# Patient Record
Sex: MALE | Race: WHITE | NOT HISPANIC OR LATINO | Employment: FULL TIME | URBAN - NONMETROPOLITAN AREA
[De-identification: names, ages, dates, MRNs, and addresses within clinical notes are randomized per-mention and may not be internally consistent; named-entity substitution may affect disease eponyms.]

---

## 2024-09-17 ENCOUNTER — HOSPITAL ENCOUNTER (EMERGENCY)
Facility: HOSPITAL | Age: 64
Discharge: HOME | End: 2024-09-17
Attending: EMERGENCY MEDICINE
Payer: COMMERCIAL

## 2024-09-17 ENCOUNTER — APPOINTMENT (EMERGENCY)
Dept: RADIOLOGY | Facility: HOSPITAL | Age: 64
End: 2024-09-17
Payer: COMMERCIAL

## 2024-09-17 VITALS
TEMPERATURE: 97.5 F | OXYGEN SATURATION: 99 % | HEART RATE: 69 BPM | WEIGHT: 200.84 LBS | SYSTOLIC BLOOD PRESSURE: 164 MMHG | RESPIRATION RATE: 17 BRPM | DIASTOLIC BLOOD PRESSURE: 84 MMHG

## 2024-09-17 DIAGNOSIS — M54.30 SCIATICA: Primary | ICD-10-CM

## 2024-09-17 PROCEDURE — 72100 X-RAY EXAM L-S SPINE 2/3 VWS: CPT

## 2024-09-17 PROCEDURE — 99283 EMERGENCY DEPT VISIT LOW MDM: CPT

## 2024-09-17 PROCEDURE — 99284 EMERGENCY DEPT VISIT MOD MDM: CPT | Performed by: EMERGENCY MEDICINE

## 2024-09-17 RX ORDER — ACETAMINOPHEN 325 MG/1
975 TABLET ORAL ONCE
Status: COMPLETED | OUTPATIENT
Start: 2024-09-17 | End: 2024-09-17

## 2024-09-17 RX ORDER — DIAZEPAM 5 MG
5-10 TABLET ORAL EVERY 6 HOURS PRN
Qty: 10 TABLET | Refills: 0 | Status: SHIPPED | OUTPATIENT
Start: 2024-09-17 | End: 2024-09-27

## 2024-09-17 RX ORDER — IBUPROFEN 400 MG/1
400 TABLET, FILM COATED ORAL ONCE
Status: COMPLETED | OUTPATIENT
Start: 2024-09-17 | End: 2024-09-17

## 2024-09-17 RX ADMIN — ACETAMINOPHEN 325MG 975 MG: 325 TABLET ORAL at 11:36

## 2024-09-17 RX ADMIN — IBUPROFEN 400 MG: 400 TABLET, FILM COATED ORAL at 11:36

## 2024-09-17 NOTE — ED PROVIDER NOTES
1. Sciatica      ED Disposition       ED Disposition   Discharge    Condition   Stable    Date/Time   Tue Sep 17, 2024 11:56 AM    Comment   Yassine A Stump discharge to home/self care.                   Assessment & Plan       Medical Decision Making  This patient presents with left lower back pain radiating into left knee/thigh.   Diagnostic considerations include AAA, discitis, epidural abscess, sciatica. See ED Course.       Amount and/or Complexity of Data Reviewed  Radiology: ordered and independent interpretation performed. Decision-making details documented in ED Course.  ECG/medicine tests: ordered and independent interpretation performed. Decision-making details documented in ED Course.    Risk  OTC drugs.  Prescription drug management.                  ED Course as of 09/17/24 1209   Tue Sep 17, 2024   1151 XR spine lumbar 2 or 3 views injury  No fracture or malalignment interpreted by me   1209 Remained stable for close outpatient follow-up, discussed x-ray findings and close outpatient follow-up with his clinician spine or pain and very agreeable, will return if worse or additional symptoms       Medications   acetaminophen (TYLENOL) tablet 975 mg (975 mg Oral Given 9/17/24 1136)   ibuprofen (MOTRIN) tablet 400 mg (400 mg Oral Given 9/17/24 1136)       History of Present Illness       63-year-old male describes subacute onset left lower back pain that began 4 days ago with radiation into left thigh, muscular fasciculations.  Worse with movement.  No direct injury.  No bowel or bladder changes.  No fever.  No history of cancer or steroid use.      History provided by:  Patient  Back Pain  Location:  Lumbar spine and sacro-iliac joint  Quality:  Aching  Radiates to:  L thigh  Pain severity:  Severe  Onset quality:  Gradual  Timing:  Constant  Progression:  Unchanged  Chronicity:  New  Context: physical stress    Relieved by:  NSAIDs  Worsened by:  Movement  Ineffective treatments:  NSAIDs and OTC  medications  Associated symptoms: no abdominal pain, no bladder incontinence, no bowel incontinence, no fever, no numbness, no paresthesias, no perianal numbness, no tingling and no weakness    Associated symptoms comment:  Patient states left lower extremity is not weak but when ambulating up steps leads with his right leg  Risk factors: no hx of cancer            Review of Systems   Constitutional:  Negative for fever.   Gastrointestinal:  Negative for abdominal pain and bowel incontinence.   Genitourinary:  Negative for bladder incontinence.   Musculoskeletal:  Positive for back pain.   Neurological:  Negative for tingling, weakness, numbness and paresthesias.   All other systems reviewed and are negative.          Objective     ED Triage Vitals   Temperature Pulse Blood Pressure Respirations SpO2 Patient Position - Orthostatic VS   09/17/24 1108 09/17/24 1108 09/17/24 1112 09/17/24 1108 09/17/24 1108 09/17/24 1108   97.5 °F (36.4 °C) 69 164/84 17 99 % Sitting      Temp Source Heart Rate Source BP Location FiO2 (%) Pain Score    09/17/24 1108 09/17/24 1108 09/17/24 1108 -- 09/17/24 1108    Temporal Monitor Right arm  6        Physical Exam  Vitals and nursing note reviewed.   Constitutional:       General: He is not in acute distress.     Appearance: Normal appearance.      Comments: Alert, comfortable appearing, conversational, articulate   HENT:      Head: Normocephalic and atraumatic.      Right Ear: External ear normal.      Left Ear: External ear normal.      Nose: Nose normal.      Mouth/Throat:      Mouth: Mucous membranes are moist.   Eyes:      Extraocular Movements: Extraocular movements intact.      Pupils: Pupils are equal, round, and reactive to light.   Cardiovascular:      Rate and Rhythm: Normal rate and regular rhythm.      Heart sounds: No murmur heard.  Pulmonary:      Effort: Pulmonary effort is normal. No respiratory distress.      Breath sounds: Normal breath sounds. No wheezing or rales.    Abdominal:      General: Abdomen is flat. Bowel sounds are normal. There is no distension.      Tenderness: There is no abdominal tenderness. There is no guarding or rebound.   Musculoskeletal:      Cervical back: Neck supple.      Right lower leg: No edema.      Left lower leg: No edema.      Comments: No midline spinal tenderness or deformity in cervical thoracic or lumbar areas, left sacroiliac and paralumbar muscular tenderness without overlying skin changes or swelling, bilateral lower extremity pulses 2+ femoral, popliteal and dorsal pedal, deep tendon reflexes 1-2/4 at knees and ankles bilaterally, stable to stand and ambulate well, normally balanced, can stand on toes and heels, bilateral lower extremity sensation is intact and symmetric, no saddle area anesthesia   Skin:     Findings: No rash.   Neurological:      General: No focal deficit present.      Mental Status: He is alert and oriented to person, place, and time.      Cranial Nerves: Cranial nerve deficit present.      Motor: No weakness.      Coordination: Coordination normal.   Psychiatric:         Mood and Affect: Mood normal.         Behavior: Behavior normal.         Thought Content: Thought content normal.         Labs Reviewed - No data to display  XR spine lumbar 2 or 3 views injury    (Results Pending)       Procedures       Terence Crisostomo DO  09/17/24 6526

## 2024-09-17 NOTE — DISCHARGE INSTRUCTIONS
Return immediately if worse or any new symptoms  Tylenol 1000 mg every 6 hours as needed  and/or  Advil 400 mg every 6 hours as needed  May take both together  Please follow-up with your clinician within 1 week or spine or pain specialist

## 2025-07-16 ENCOUNTER — HOSPITAL ENCOUNTER (INPATIENT)
Facility: HOSPITAL | Age: 65
LOS: 2 days | Discharge: HOME | DRG: 694 | End: 2025-07-18
Attending: EMERGENCY MEDICINE | Admitting: INTERNAL MEDICINE
Payer: COMMERCIAL

## 2025-07-16 ENCOUNTER — APPOINTMENT (OUTPATIENT)
Dept: CT IMAGING | Facility: HOSPITAL | Age: 65
DRG: 694 | End: 2025-07-16
Payer: COMMERCIAL

## 2025-07-16 DIAGNOSIS — A41.9 SEPSIS (HCC): Primary | ICD-10-CM

## 2025-07-16 DIAGNOSIS — N20.1 URETERAL STONE: ICD-10-CM

## 2025-07-16 LAB
ALBUMIN SERPL BCG-MCNC: 4.7 G/DL (ref 3.5–5)
ALP SERPL-CCNC: 60 U/L (ref 34–104)
ALT SERPL W P-5'-P-CCNC: 15 U/L (ref 7–52)
ANION GAP SERPL CALCULATED.3IONS-SCNC: 18 MMOL/L (ref 4–13)
AST SERPL W P-5'-P-CCNC: 16 U/L (ref 13–39)
BACTERIA UR QL AUTO: ABNORMAL /HPF
BASOPHILS # BLD AUTO: 0.07 THOUSANDS/ÂΜL (ref 0–0.1)
BASOPHILS NFR BLD AUTO: 0 % (ref 0–1)
BILIRUB SERPL-MCNC: 0.73 MG/DL (ref 0.2–1)
BILIRUB UR QL STRIP: NEGATIVE
BUN SERPL-MCNC: 22 MG/DL (ref 5–25)
CALCIUM SERPL-MCNC: 9.6 MG/DL (ref 8.4–10.2)
CHLORIDE SERPL-SCNC: 100 MMOL/L (ref 96–108)
CLARITY UR: CLEAR
CO2 SERPL-SCNC: 20 MMOL/L (ref 21–32)
COLOR UR: YELLOW
CREAT SERPL-MCNC: 1.22 MG/DL (ref 0.6–1.3)
EOSINOPHIL # BLD AUTO: 0.06 THOUSAND/ÂΜL (ref 0–0.61)
EOSINOPHIL NFR BLD AUTO: 0 % (ref 0–6)
ERYTHROCYTE [DISTWIDTH] IN BLOOD BY AUTOMATED COUNT: 12.6 % (ref 11.6–15.1)
GFR SERPL CREATININE-BSD FRML MDRD: 61 ML/MIN/1.73SQ M
GLUCOSE SERPL-MCNC: 192 MG/DL (ref 65–140)
GLUCOSE UR STRIP-MCNC: NEGATIVE MG/DL
HCT VFR BLD AUTO: 45.9 % (ref 36.5–49.3)
HGB BLD-MCNC: 15.6 G/DL (ref 12–17)
HGB UR QL STRIP.AUTO: ABNORMAL
IMM GRANULOCYTES # BLD AUTO: 0.12 THOUSAND/UL (ref 0–0.2)
IMM GRANULOCYTES NFR BLD AUTO: 1 % (ref 0–2)
KETONES UR STRIP-MCNC: ABNORMAL MG/DL
LACTATE SERPL-SCNC: 6.8 MMOL/L (ref 0.5–2)
LEUKOCYTE ESTERASE UR QL STRIP: NEGATIVE
LIPASE SERPL-CCNC: 14 U/L (ref 11–82)
LYMPHOCYTES # BLD AUTO: 1.93 THOUSANDS/ÂΜL (ref 0.6–4.47)
LYMPHOCYTES NFR BLD AUTO: 11 % (ref 14–44)
MCH RBC QN AUTO: 30.8 PG (ref 26.8–34.3)
MCHC RBC AUTO-ENTMCNC: 34 G/DL (ref 31.4–37.4)
MCV RBC AUTO: 91 FL (ref 82–98)
MONOCYTES # BLD AUTO: 0.62 THOUSAND/ÂΜL (ref 0.17–1.22)
MONOCYTES NFR BLD AUTO: 3 % (ref 4–12)
NEUTROPHILS # BLD AUTO: 15.41 THOUSANDS/ÂΜL (ref 1.85–7.62)
NEUTS SEG NFR BLD AUTO: 85 % (ref 43–75)
NITRITE UR QL STRIP: NEGATIVE
NON-SQ EPI CELLS URNS QL MICRO: ABNORMAL /HPF
NRBC BLD AUTO-RTO: 0 /100 WBCS
PH UR STRIP.AUTO: 6 [PH]
PLATELET # BLD AUTO: 276 THOUSANDS/UL (ref 149–390)
PMV BLD AUTO: 9.4 FL (ref 8.9–12.7)
POTASSIUM SERPL-SCNC: 3.6 MMOL/L (ref 3.5–5.3)
PROCALCITONIN SERPL-MCNC: <0.05 NG/ML
PROT SERPL-MCNC: 7.7 G/DL (ref 6.4–8.4)
PROT UR STRIP-MCNC: NEGATIVE MG/DL
RBC # BLD AUTO: 5.07 MILLION/UL (ref 3.88–5.62)
RBC #/AREA URNS AUTO: ABNORMAL /HPF
SODIUM SERPL-SCNC: 138 MMOL/L (ref 135–147)
SP GR UR STRIP.AUTO: >=1.03 (ref 1–1.03)
UROBILINOGEN UR QL STRIP.AUTO: 0.2 E.U./DL
WBC # BLD AUTO: 18.21 THOUSAND/UL (ref 4.31–10.16)
WBC #/AREA URNS AUTO: ABNORMAL /HPF

## 2025-07-16 PROCEDURE — 81001 URINALYSIS AUTO W/SCOPE: CPT | Performed by: PHYSICIAN ASSISTANT

## 2025-07-16 PROCEDURE — 87040 BLOOD CULTURE FOR BACTERIA: CPT | Performed by: PHYSICIAN ASSISTANT

## 2025-07-16 PROCEDURE — 83605 ASSAY OF LACTIC ACID: CPT | Performed by: PHYSICIAN ASSISTANT

## 2025-07-16 PROCEDURE — 99291 CRITICAL CARE FIRST HOUR: CPT | Performed by: EMERGENCY MEDICINE

## 2025-07-16 PROCEDURE — 96376 TX/PRO/DX INJ SAME DRUG ADON: CPT

## 2025-07-16 PROCEDURE — 99284 EMERGENCY DEPT VISIT MOD MDM: CPT

## 2025-07-16 PROCEDURE — 96361 HYDRATE IV INFUSION ADD-ON: CPT

## 2025-07-16 PROCEDURE — 80053 COMPREHEN METABOLIC PANEL: CPT | Performed by: EMERGENCY MEDICINE

## 2025-07-16 PROCEDURE — 84145 PROCALCITONIN (PCT): CPT | Performed by: PHYSICIAN ASSISTANT

## 2025-07-16 PROCEDURE — 96374 THER/PROPH/DIAG INJ IV PUSH: CPT

## 2025-07-16 PROCEDURE — 99223 1ST HOSP IP/OBS HIGH 75: CPT | Performed by: NURSE PRACTITIONER

## 2025-07-16 PROCEDURE — 36415 COLL VENOUS BLD VENIPUNCTURE: CPT

## 2025-07-16 PROCEDURE — 85025 COMPLETE CBC W/AUTO DIFF WBC: CPT | Performed by: EMERGENCY MEDICINE

## 2025-07-16 PROCEDURE — 96375 TX/PRO/DX INJ NEW DRUG ADDON: CPT

## 2025-07-16 PROCEDURE — 83690 ASSAY OF LIPASE: CPT | Performed by: EMERGENCY MEDICINE

## 2025-07-16 PROCEDURE — 74176 CT ABD & PELVIS W/O CONTRAST: CPT

## 2025-07-16 PROCEDURE — 96365 THER/PROPH/DIAG IV INF INIT: CPT

## 2025-07-16 RX ORDER — ONDANSETRON 2 MG/ML
4 INJECTION INTRAMUSCULAR; INTRAVENOUS ONCE
Status: COMPLETED | OUTPATIENT
Start: 2025-07-16 | End: 2025-07-16

## 2025-07-16 RX ORDER — FENTANYL CITRATE 50 UG/ML
50 INJECTION, SOLUTION INTRAMUSCULAR; INTRAVENOUS ONCE
Refills: 0 | Status: COMPLETED | OUTPATIENT
Start: 2025-07-16 | End: 2025-07-16

## 2025-07-16 RX ORDER — SODIUM CHLORIDE, SODIUM LACTATE, POTASSIUM CHLORIDE, CALCIUM CHLORIDE 600; 310; 30; 20 MG/100ML; MG/100ML; MG/100ML; MG/100ML
100 INJECTION, SOLUTION INTRAVENOUS CONTINUOUS
Status: DISCONTINUED | OUTPATIENT
Start: 2025-07-16 | End: 2025-07-18

## 2025-07-16 RX ORDER — OXYBUTYNIN CHLORIDE 5 MG/1
5 TABLET ORAL 3 TIMES DAILY PRN
Status: DISCONTINUED | OUTPATIENT
Start: 2025-07-16 | End: 2025-07-16

## 2025-07-16 RX ORDER — ONDANSETRON 2 MG/ML
4 INJECTION INTRAMUSCULAR; INTRAVENOUS EVERY 6 HOURS PRN
Status: DISCONTINUED | OUTPATIENT
Start: 2025-07-16 | End: 2025-07-18 | Stop reason: HOSPADM

## 2025-07-16 RX ORDER — HYDROMORPHONE HCL/PF 1 MG/ML
0.5 SYRINGE (ML) INJECTION ONCE
Refills: 0 | Status: COMPLETED | OUTPATIENT
Start: 2025-07-16 | End: 2025-07-16

## 2025-07-16 RX ORDER — FAMOTIDINE 20 MG/1
20 TABLET, FILM COATED ORAL
Status: DISCONTINUED | OUTPATIENT
Start: 2025-07-16 | End: 2025-07-18 | Stop reason: HOSPADM

## 2025-07-16 RX ORDER — HYDROMORPHONE HCL/PF 1 MG/ML
0.5 SYRINGE (ML) INJECTION
Refills: 0 | Status: DISCONTINUED | OUTPATIENT
Start: 2025-07-16 | End: 2025-07-18 | Stop reason: HOSPADM

## 2025-07-16 RX ORDER — ACETAMINOPHEN 325 MG/1
975 TABLET ORAL EVERY 8 HOURS SCHEDULED
Status: DISCONTINUED | OUTPATIENT
Start: 2025-07-16 | End: 2025-07-18 | Stop reason: HOSPADM

## 2025-07-16 RX ORDER — OXYBUTYNIN CHLORIDE 5 MG/1
5 TABLET ORAL 3 TIMES DAILY
Status: DISCONTINUED | OUTPATIENT
Start: 2025-07-16 | End: 2025-07-18 | Stop reason: HOSPADM

## 2025-07-16 RX ORDER — HYDROMORPHONE HCL/PF 1 MG/ML
0.5 SYRINGE (ML) INJECTION ONCE
Status: COMPLETED | OUTPATIENT
Start: 2025-07-16 | End: 2025-07-16

## 2025-07-16 RX ORDER — OXYCODONE HYDROCHLORIDE 5 MG/1
2.5 TABLET ORAL EVERY 4 HOURS PRN
Refills: 0 | Status: DISCONTINUED | OUTPATIENT
Start: 2025-07-16 | End: 2025-07-18 | Stop reason: HOSPADM

## 2025-07-16 RX ORDER — TAMSULOSIN HYDROCHLORIDE 0.4 MG/1
0.4 CAPSULE ORAL
Status: DISCONTINUED | OUTPATIENT
Start: 2025-07-17 | End: 2025-07-18 | Stop reason: HOSPADM

## 2025-07-16 RX ORDER — OXYCODONE HYDROCHLORIDE 5 MG/1
5 TABLET ORAL EVERY 4 HOURS PRN
Refills: 0 | Status: DISCONTINUED | OUTPATIENT
Start: 2025-07-16 | End: 2025-07-18 | Stop reason: HOSPADM

## 2025-07-16 RX ORDER — ENOXAPARIN SODIUM 100 MG/ML
40 INJECTION SUBCUTANEOUS DAILY
Status: DISCONTINUED | OUTPATIENT
Start: 2025-07-18 | End: 2025-07-17

## 2025-07-16 RX ORDER — CEFTRIAXONE 1 G/50ML
1000 INJECTION, SOLUTION INTRAVENOUS ONCE
Status: COMPLETED | OUTPATIENT
Start: 2025-07-16 | End: 2025-07-16

## 2025-07-16 RX ADMIN — SODIUM CHLORIDE, SODIUM LACTATE, POTASSIUM CHLORIDE, AND CALCIUM CHLORIDE 500 ML: .6; .31; .03; .02 INJECTION, SOLUTION INTRAVENOUS at 21:17

## 2025-07-16 RX ADMIN — ONDANSETRON 4 MG: 2 INJECTION INTRAMUSCULAR; INTRAVENOUS at 19:09

## 2025-07-16 RX ADMIN — FENTANYL CITRATE 50 MCG: 50 INJECTION INTRAMUSCULAR; INTRAVENOUS at 19:24

## 2025-07-16 RX ADMIN — HYDROMORPHONE HYDROCHLORIDE 0.5 MG: 1 INJECTION, SOLUTION INTRAMUSCULAR; INTRAVENOUS; SUBCUTANEOUS at 20:18

## 2025-07-16 RX ADMIN — FENTANYL CITRATE 50 MCG: 50 INJECTION INTRAMUSCULAR; INTRAVENOUS at 19:10

## 2025-07-16 RX ADMIN — OXYBUTYNIN CHLORIDE 5 MG: 5 TABLET ORAL at 23:04

## 2025-07-16 RX ADMIN — ACETAMINOPHEN 975 MG: 325 TABLET ORAL at 22:22

## 2025-07-16 RX ADMIN — CEFTRIAXONE 1000 MG: 1 INJECTION, SOLUTION INTRAVENOUS at 19:25

## 2025-07-16 RX ADMIN — FAMOTIDINE 20 MG: 20 TABLET, FILM COATED ORAL at 23:04

## 2025-07-16 RX ADMIN — HYDROMORPHONE HYDROCHLORIDE 0.5 MG: 1 INJECTION, SOLUTION INTRAMUSCULAR; INTRAVENOUS; SUBCUTANEOUS at 21:39

## 2025-07-16 RX ADMIN — SODIUM CHLORIDE 500 ML: 0.9 INJECTION, SOLUTION INTRAVENOUS at 20:16

## 2025-07-16 RX ADMIN — SODIUM CHLORIDE, SODIUM LACTATE, POTASSIUM CHLORIDE, AND CALCIUM CHLORIDE 100 ML/HR: .6; .31; .03; .02 INJECTION, SOLUTION INTRAVENOUS at 23:04

## 2025-07-16 RX ADMIN — ONDANSETRON 4 MG: 2 INJECTION INTRAMUSCULAR; INTRAVENOUS at 19:59

## 2025-07-16 RX ADMIN — SODIUM CHLORIDE 1000 ML: 0.9 INJECTION, SOLUTION INTRAVENOUS at 19:12

## 2025-07-16 RX ADMIN — OXYCODONE HYDROCHLORIDE 5 MG: 5 TABLET ORAL at 22:26

## 2025-07-16 NOTE — ED NOTES
This RN attempted x2 for IV insertion and was unsuccessful. Another RN to attempt.      Tanesha Guthrie, RN  07/16/25 7005

## 2025-07-16 NOTE — ED PROVIDER NOTES
Time reflects when diagnosis was documented in both MDM as applicable and the Disposition within this note       Time User Action Codes Description Comment    7/16/2025  8:25 PM Merly Krishnamurthy Add [A41.9] Sepsis (HCC)     7/16/2025  8:28 PM Марина Page Add [N20.1] Ureteral stone           ED Disposition       ED Disposition   Admit    Condition   Stable    Date/Time   Wed Jul 16, 2025  8:25 PM    Comment   Case was discussed with Kaylee and the patient's admission status was agreed to be Admission Status: inpatient status to the service of Dr. Guzman .               Assessment & Plan       Medical Decision Making  64-year-old male presents to the emergency department for evaluation sudden onset right flank pain.  Vitals and medical record reviewed. Several of these diagnoses have been evaluated and ruled out by history and physical. As needed, patient will be further evaluated with laboratory and imaging studies. Higher level diagnostics, such as CT imaging or ultrasound, may also be required. Please see work-up portion of the note for further evaluation of patient's risk. Socioeconomic factors were also considered as part of the decision-making process. Unless otherwise stated in the chart or patient is admitted as elsewhere documented, any previously prescribed medications will be maintained.       Problems Addressed:  Sepsis (HCC): acute illness or injury    Amount and/or Complexity of Data Reviewed  Labs: ordered. Decision-making details documented in ED Course.  Discussion of management or test interpretation with external provider(s): Urology for recommendations   Medicine for admission     Risk  Prescription drug management.  Decision regarding hospitalization.        ED Course as of 07/16/25 2155 Wed Jul 16, 2025   1901 WBC(!): 18.21   1927 Creatinine: 1.22   1931 Secure chat sent to urology    1954 LACTIC ACID(!!): 6.8   1954 Patient vomiting.  States he continues with nausea.  States pain is improved but  still significant.   2012 Urology reviewed case and findings recommended admit to [facility]   2013 Secure chat sent to medicine.   2025 Accepted for admission   2036 Critical Care Time Statement: Upon my evaluation, this patient had a high probability of imminent or life-threatening deterioration due to sepsis, which required my direct attention, intervention, and personal management.  I spent a total of 35 minutes directly providing critical care services, including interpretation of complex medical databases, evaluating for the presence of life-threatening injuries or illnesses, and interpretation of hemodynamic data. This time is exclusive of procedures, teaching, treating other patients, family meetings, and any prior time recorded by providers other than myself.          Medications   lactated ringers bolus 500 mL (has no administration in time range)   tamsulosin (FLOMAX) capsule 0.4 mg (has no administration in time range)   oxybutynin (DITROPAN) tablet 5 mg (has no administration in time range)   fentaNYL injection 50 mcg (50 mcg Intravenous Given 7/16/25 1910)   ondansetron (ZOFRAN) injection 4 mg (4 mg Intravenous Given 7/16/25 1909)   sodium chloride 0.9 % bolus 1,000 mL (0 mL Intravenous Stopped 7/16/25 2016)   cefTRIAXone (ROCEPHIN) IVPB (premix in dextrose) 1,000 mg 50 mL (0 mg Intravenous Stopped 7/16/25 2000)   fentaNYL injection 50 mcg (50 mcg Intravenous Given 7/16/25 1924)   ondansetron (ZOFRAN) injection 4 mg (4 mg Intravenous Given 7/16/25 1959)   HYDROmorphone (DILAUDID) injection 0.5 mg (0.5 mg Intravenous Given 7/16/25 2018)   sodium chloride 0.9 % bolus 500 mL (500 mL Intravenous New Bag 7/16/25 2016)       ED Risk Strat Scores                    (ISAR) Identification of Seniors at Risk  Before the illness or injury that brought you to the Emergency, did you need someone to help you on a regular basis?: 0  In the last 24 hours, have you needed more help than usual?: 0  Have you been hospitalized  for one or more nights during the past 6 months?: 0  In general, do you see well?: 1  In general, do you have serious problems with your memory?: 0  Do you take more than three different medications every day?: 0  ISAR Score: 1            SBIRT 20yo+      Flowsheet Row Most Recent Value   Initial Alcohol Screen:  AUDIT-C     1. How often do you have a drink containing alcohol? 0 Filed at: 07/16/2025 1723   2. How many drinks containing alcohol do you have on a typical day you are drinking?  0 Filed at: 07/16/2025 1723   3b. FEMALE Any Age, or MALE 65+: How often do you have 4 or more drinks on one occassion? 0 Filed at: 07/16/2025 1723   Audit-C Score 0 Filed at: 07/16/2025 1723   JOCELYN: How many times in the past year have you...    Used an illegal drug or used a prescription medication for non-medical reasons? Never Filed at: 07/16/2025 1723                            History of Present Illness       Chief Complaint   Patient presents with    Flank Pain     Right flank pain started around 1600 hrs today states it hit him like a ton of bricks.        Past Medical History[1]   Past Surgical History[2]   Family History[3]   Social History[4]   E-Cigarette/Vaping    E-Cigarette Use Never User       E-Cigarette/Vaping Substances      I have reviewed and agree with the history as documented.     64-year-old male presents to the emergency department for evaluation of right flank pain.  Patient states he had sudden onset of right flank pain while driving around 4 PM this afternoon.  Patient states pain was sudden and severe.  Has been pretty consistent since.  He reports nausea denies vomiting.  Reports chills but denies any fevers.  Patient denies any diarrhea or constipation.  He denies dysuria hematuria urinary frequency.  Patient states he has never had pain like this before.        Review of Systems   Constitutional:  Positive for chills. Negative for appetite change, diaphoresis, fatigue and fever.   HENT:  Negative.     Respiratory: Negative.     Cardiovascular: Negative.    Gastrointestinal:  Positive for abdominal pain and nausea. Negative for diarrhea and vomiting.   Genitourinary:  Positive for flank pain.   Skin: Negative.    Neurological: Negative.    All other systems reviewed and are negative.          Objective       ED Triage Vitals   Temperature Pulse Blood Pressure Respirations SpO2 Patient Position - Orthostatic VS   07/16/25 1721 07/16/25 1721 07/16/25 1721 07/16/25 1721 07/16/25 1721 07/16/25 1721   97.8 °F (36.6 °C) 72 (!) 192/85 18 100 % Sitting      Temp Source Heart Rate Source BP Location FiO2 (%) Pain Score    07/16/25 1721 07/16/25 1721 07/16/25 1900 -- 07/16/25 1721    Temporal Monitor Right arm  10 - Worst Possible Pain      Vitals      Date and Time Temp Pulse SpO2 Resp BP Pain Score FACES Pain Rating User   07/16/25 2139 -- -- -- -- -- 7 -- MM   07/16/25 2100 97.3 °F (36.3 °C) 96 96 % 18 167/90 -- -- DII   07/16/25 2100 -- -- -- -- -- 2 -- MM   07/16/25 2018 -- -- -- -- -- 6 -- NM   07/16/25 2000 -- 89 97 % 24 181/85 -- -- AR   07/16/25 1924 -- -- -- -- -- 9 -- MB   07/16/25 1910 -- -- -- -- -- 10 - Worst Possible Pain -- MB   07/16/25 1900 -- 86 100 % 20 194/93 -- -- AR   07/16/25 1721 97.8 °F (36.6 °C) 72 100 % 18 192/85 10 - Worst Possible Pain -- BF            Physical Exam  Vitals and nursing note reviewed.   Constitutional:       General: He is in acute distress.      Appearance: He is well-developed. He is ill-appearing.   HENT:      Head: Normocephalic and atraumatic.     Eyes:      Conjunctiva/sclera: Conjunctivae normal.       Cardiovascular:      Rate and Rhythm: Normal rate and regular rhythm.      Heart sounds: No murmur heard.  Pulmonary:      Effort: Pulmonary effort is normal. No respiratory distress.      Breath sounds: Normal breath sounds.   Abdominal:      Palpations: Abdomen is soft.      Tenderness: There is abdominal tenderness. There is right CVA tenderness.      Musculoskeletal:         General: No swelling.      Cervical back: Neck supple.     Skin:     General: Skin is warm and dry.      Capillary Refill: Capillary refill takes less than 2 seconds.     Neurological:      Mental Status: He is alert.     Psychiatric:         Mood and Affect: Mood normal.         Results Reviewed       Procedure Component Value Units Date/Time    Procalcitonin [[id number]]  (Normal) Collected: 07/16/25 1855    Lab Status: Final result Specimen: Blood from Arm, Left Updated: 07/16/25 2132     Procalcitonin <0.05 ng/ml     Lactic acid, plasma (w/reflex if result > 2.0) [[id number]]  (Abnormal) Collected: 07/16/25 1913    Lab Status: Final result Specimen: Blood from Arm, Left Updated: 07/16/25 1947     LACTIC ACID 6.8 mmol/L     Narrative:      Result may be elevated if tourniquet was used during collection.    Lactic acid 2 Hours [[id number]]     Lab Status: No result Specimen: Blood     Urine Microscopic [[id number]]  (Abnormal) Collected: 07/16/25 1913    Lab Status: Final result Specimen: Urine, Clean Catch Updated: 07/16/25 1938     RBC, UA 10-20 /hpf      WBC, UA 0-5 /hpf      Epithelial Cells Occasional /hpf      Bacteria, UA Occasional /hpf     UA w Reflex to Microscopic w Reflex to Culture [[id number]]  (Abnormal) Collected: 07/16/25 1913    Lab Status: Final result Specimen: Urine, Clean Catch Updated: 07/16/25 1927     Color, UA Yellow     Clarity, UA Clear     Specific Gravity, UA >=1.030     pH, UA 6.0     Leukocytes, UA Negative     Nitrite, UA Negative     Protein, UA Negative mg/dl      Glucose, UA Negative mg/dl      Ketones, UA Trace mg/dl      Urobilinogen, UA 0.2 E.U./dl      Bilirubin, UA Negative     Occult Blood, UA Large    Blood culture #2 [[id number]] Collected: 07/16/25 1848    Lab Status: In process Specimen: Blood from Line, Venous Updated: 07/16/25 1924    Blood culture #1 [[id number]] Collected: 07/16/25 1921    Lab Status: In process Specimen: Blood from Line,  Venous Updated: 07/16/25 1924    Comprehensive metabolic panel [[id number]]  (Abnormal) Collected: 07/16/25 1855    Lab Status: Final result Specimen: Blood from Arm, Left Updated: 07/16/25 1922     Sodium 138 mmol/L      Potassium 3.6 mmol/L      Chloride 100 mmol/L      CO2 20 mmol/L      ANION GAP 18 mmol/L      BUN 22 mg/dL      Creatinine 1.22 mg/dL      Glucose 192 mg/dL      Calcium 9.6 mg/dL      AST 16 U/L      ALT 15 U/L      Alkaline Phosphatase 60 U/L      Total Protein 7.7 g/dL      Albumin 4.7 g/dL      Total Bilirubin 0.73 mg/dL      eGFR 61 ml/min/1.73sq m     Narrative:      National Kid[employer] guidelines for Chronic Kidney Disease (CKD):     Stage 1 with normal or high GFR (GFR > 90 mL/min/1.73 square meters)    Stage 2 Mild CKD (GFR = 60-89 mL/min/1.73 square meters)    Stage 3A Moderate CKD (GFR = 45-59 mL/min/1.73 square meters)    Stage 3B Moderate CKD (GFR = 30-44 mL/min/1.73 square meters)    Stage 4 Severe CKD (GFR = 15-29 mL/min/1.73 square meters)    Stage 5 End Stage CKD (GFR <15 mL/min/1.73 square meters)  Note: GFR calculation is accurate only with a steady state creatinine    Lipase [[id number]]  (Normal) Collected: 07/16/25 1855    Lab Status: Final result Specimen: Blood from Arm, Left Updated: 07/16/25 1922     Lipase 14 u/L     CBC and differential [[id number]]  (Abnormal) Collected: 07/16/25 1855    Lab Status: Final result Specimen: Blood from Arm, Left Updated: 07/16/25 1900     WBC 18.21 Thousand/uL      RBC 5.07 Million/uL      Hemoglobin 15.6 g/dL      Hematocrit 45.9 %      MCV 91 fL      MCH 30.8 pg      MCHC 34.0 g/dL      RDW 12.6 %      MPV 9.4 fL      Platelets 276 Thousands/uL      nRBC 0 /100 WBCs      Segmented % 85 %      Immature Grans % 1 %      Lymphocytes % 11 %      Monocytes % 3 %      Eosinophils Relative 0 %      Basophils Relative 0 %      Absolute Neutrophils 15.41 Thousands/µL      Absolute Immature Grans 0.12 Thousand/uL      Absolute  Lymphocytes 1.93 Thousands/µL      Absolute Monocytes 0.62 Thousand/µL      Eosinophils Absolute 0.06 Thousand/µL      Basophils Absolute 0.07 Thousands/µL             CT renal stone study abdomen pelvis wo contrast   Final Interpretation by Toni Plascencia MD (07/16 1807)      Fullness of the right renal pelvis with relative hyperdensity. Suspect infectious or hemorrhagic etiology.   No hydroureter. Punctate calculus suspected in the distal ureter. Correlate with urinalysis follow-up per urology.         Workstation performed: [id number]             Procedures    ED Medication and Procedure Management   None     There are no discharge medications for this patient.    No discharge procedures on file.  ED SEPSIS DOCUMENTATION   Time reflects when diagnosis was documented in both MDM as applicable and the Disposition within this note       Time User Action Codes Description Comment    7/16/2025  8:25 PM Merly Krishnamurthy Add [A41.9] Sepsis (HCC)     7/16/2025  8:28 PM Марина Page Add [N20.1] Ureteral stone                    [1] No past medical history on file.  [2] No past surgical history on file.  [3] No family history on file.  [4]   Social History  Tobacco Use    Smoking status: Never    Smokeless tobacco: Never   Vaping Use    Vaping status: Never Used   Substance Use Topics    Alcohol use: Not Currently    Drug use: Never        Merly Krishnamurthy PA-C  07/16/25 2197

## 2025-07-17 DIAGNOSIS — N20.1 URETERAL STONE: Primary | ICD-10-CM

## 2025-07-17 PROBLEM — I16.9 HYPERTENSIVE CRISIS: Status: ACTIVE | Noted: 2025-07-17

## 2025-07-17 PROBLEM — R65.10 SIRS (SYSTEMIC INFLAMMATORY RESPONSE SYNDROME) (HCC): Status: ACTIVE | Noted: 2025-07-17

## 2025-07-17 LAB
ANION GAP SERPL CALCULATED.3IONS-SCNC: 7 MMOL/L (ref 4–13)
BUN SERPL-MCNC: 20 MG/DL (ref 5–25)
CALCIUM SERPL-MCNC: 8.8 MG/DL (ref 8.4–10.2)
CHLORIDE SERPL-SCNC: 104 MMOL/L (ref 96–108)
CO2 SERPL-SCNC: 23 MMOL/L (ref 21–32)
CREAT SERPL-MCNC: 1.26 MG/DL (ref 0.6–1.3)
ERYTHROCYTE [DISTWIDTH] IN BLOOD BY AUTOMATED COUNT: 12.6 % (ref 11.6–15.1)
GFR SERPL CREATININE-BSD FRML MDRD: 59 ML/MIN/1.73SQ M
GLUCOSE SERPL-MCNC: 148 MG/DL (ref 65–140)
HCT VFR BLD AUTO: 47.6 % (ref 36.5–49.3)
HGB BLD-MCNC: 16.1 G/DL (ref 12–17)
LACTATE SERPL-SCNC: 1.7 MMOL/L (ref 0.5–2)
LACTATE SERPL-SCNC: 2.7 MMOL/L (ref 0.5–2)
MAGNESIUM SERPL-MCNC: 1.9 MG/DL (ref 1.9–2.7)
MCH RBC QN AUTO: 31.1 PG (ref 26.8–34.3)
MCHC RBC AUTO-ENTMCNC: 33.8 G/DL (ref 31.4–37.4)
MCV RBC AUTO: 92 FL (ref 82–98)
PLATELET # BLD AUTO: 230 THOUSANDS/UL (ref 149–390)
PMV BLD AUTO: 9.5 FL (ref 8.9–12.7)
POTASSIUM SERPL-SCNC: 5.2 MMOL/L (ref 3.5–5.3)
PSA SERPL-MCNC: 1.91 NG/ML (ref 0–4)
RBC # BLD AUTO: 5.17 MILLION/UL (ref 3.88–5.62)
SODIUM SERPL-SCNC: 134 MMOL/L (ref 135–147)
WBC # BLD AUTO: 17.92 THOUSAND/UL (ref 4.31–10.16)

## 2025-07-17 PROCEDURE — 83735 ASSAY OF MAGNESIUM: CPT | Performed by: NURSE PRACTITIONER

## 2025-07-17 PROCEDURE — 80048 BASIC METABOLIC PNL TOTAL CA: CPT | Performed by: NURSE PRACTITIONER

## 2025-07-17 PROCEDURE — 85027 COMPLETE CBC AUTOMATED: CPT | Performed by: NURSE PRACTITIONER

## 2025-07-17 PROCEDURE — 83605 ASSAY OF LACTIC ACID: CPT | Performed by: STUDENT IN AN ORGANIZED HEALTH CARE EDUCATION/TRAINING PROGRAM

## 2025-07-17 PROCEDURE — 83605 ASSAY OF LACTIC ACID: CPT | Performed by: PHYSICIAN ASSISTANT

## 2025-07-17 PROCEDURE — 87086 URINE CULTURE/COLONY COUNT: CPT

## 2025-07-17 PROCEDURE — 88112 CYTOPATH CELL ENHANCE TECH: CPT | Performed by: PATHOLOGY

## 2025-07-17 PROCEDURE — 84153 ASSAY OF PSA TOTAL: CPT

## 2025-07-17 PROCEDURE — 99232 SBSQ HOSP IP/OBS MODERATE 35: CPT | Performed by: STUDENT IN AN ORGANIZED HEALTH CARE EDUCATION/TRAINING PROGRAM

## 2025-07-17 PROCEDURE — 99222 1ST HOSP IP/OBS MODERATE 55: CPT

## 2025-07-17 RX ORDER — MAGNESIUM HYDROXIDE/ALUMINUM HYDROXICE/SIMETHICONE 120; 1200; 1200 MG/30ML; MG/30ML; MG/30ML
30 SUSPENSION ORAL EVERY 4 HOURS PRN
Status: DISCONTINUED | OUTPATIENT
Start: 2025-07-17 | End: 2025-07-18 | Stop reason: HOSPADM

## 2025-07-17 RX ADMIN — OXYCODONE HYDROCHLORIDE 5 MG: 5 TABLET ORAL at 03:09

## 2025-07-17 RX ADMIN — FAMOTIDINE 20 MG: 20 TABLET, FILM COATED ORAL at 20:57

## 2025-07-17 RX ADMIN — OXYBUTYNIN CHLORIDE 5 MG: 5 TABLET ORAL at 08:48

## 2025-07-17 RX ADMIN — ALUMINUM HYDROXIDE, MAGNESIUM HYDROXIDE, AND DIMETHICONE 30 ML: 200; 20; 200 SUSPENSION ORAL at 14:17

## 2025-07-17 RX ADMIN — OXYCODONE HYDROCHLORIDE 2.5 MG: 5 TABLET ORAL at 10:31

## 2025-07-17 RX ADMIN — OXYCODONE HYDROCHLORIDE 5 MG: 5 TABLET ORAL at 11:42

## 2025-07-17 RX ADMIN — OXYBUTYNIN CHLORIDE 5 MG: 5 TABLET ORAL at 15:39

## 2025-07-17 RX ADMIN — OXYCODONE HYDROCHLORIDE 5 MG: 5 TABLET ORAL at 18:03

## 2025-07-17 RX ADMIN — TAMSULOSIN HYDROCHLORIDE 0.4 MG: 0.4 CAPSULE ORAL at 15:39

## 2025-07-17 RX ADMIN — SODIUM CHLORIDE, SODIUM LACTATE, POTASSIUM CHLORIDE, AND CALCIUM CHLORIDE 100 ML/HR: .6; .31; .03; .02 INJECTION, SOLUTION INTRAVENOUS at 18:24

## 2025-07-17 RX ADMIN — ACETAMINOPHEN 975 MG: 325 TABLET ORAL at 20:57

## 2025-07-17 RX ADMIN — ACETAMINOPHEN 975 MG: 325 TABLET ORAL at 14:16

## 2025-07-17 RX ADMIN — OXYBUTYNIN CHLORIDE 5 MG: 5 TABLET ORAL at 20:57

## 2025-07-17 RX ADMIN — SODIUM CHLORIDE, SODIUM LACTATE, POTASSIUM CHLORIDE, AND CALCIUM CHLORIDE 100 ML/HR: .6; .31; .03; .02 INJECTION, SOLUTION INTRAVENOUS at 08:48

## 2025-07-17 RX ADMIN — ACETAMINOPHEN 975 MG: 325 TABLET ORAL at 06:30

## 2025-07-17 NOTE — ASSESSMENT & PLAN NOTE
Leukocytosis, tachycardia, tachypnea  Lactic acid 6.8, repeat 2.7  Secondary to obstructing ureteral stone  No evidence of pyuria/complicated UTI  Monitor fever curve, leukocytosis

## 2025-07-17 NOTE — PLAN OF CARE
Problem: PAIN - ADULT  Goal: Verbalizes/displays adequate comfort level or baseline comfort level  Description: Interventions:  - Encourage patient to monitor pain and request assistance  - Assess pain using appropriate pain scale  - Administer analgesics as ordered based on type and severity of pain and evaluate response  - Implement non-pharmacological measures as appropriate and evaluate response  - Consider cultural and social influences on pain and pain management  - Notify physician/advanced practitioner if interventions unsuccessful or patient reports new pain  - Educate patient/family on pain management process including their role and importance of  reporting pain   - Provide non-pharmacologic/complimentary pain relief interventions  Outcome: Progressing     Problem: INFECTION - ADULT  Goal: Absence or prevention of progression during hospitalization  Description: INTERVENTIONS:  - Assess and monitor for signs and symptoms of infection  - Monitor lab/diagnostic results  - Monitor all insertion sites, i.e. indwelling lines, tubes, and drains  - Monitor endotracheal if appropriate and nasal secretions for changes in amount and color  - [place] appropriate cooling/warming therapies per order  - Administer medications as ordered  - Instruct and encourage patient and family to use good hand hygiene technique  - Identify and instruct in appropriate isolation precautions for identified infection/condition  Outcome: Progressing     Problem: SAFETY ADULT  Goal: Patient will remain free of falls  Description: INTERVENTIONS:  - Educate patient/family on patient safety including physical limitations  - Instruct patient to call for assistance with activity   - Consider consulting OT/PT to assist with strengthening/mobility based on AM PAC & JH-HLM score  - Consult OT/PT to assist with strengthening/mobility   - Keep Call bell within reach  - Keep bed low and locked with side rails adjusted as appropriate  - Keep  care items and personal belongings within reach  - Initiate and maintain comfort rounds  - Make Fall Risk Sign visible to staff  - Offer Toileting every 2 Hours, in advance of need  - Apply yellow socks and bracelet for high fall risk patients  - Consider moving patient to room near nurses station  Outcome: Progressing     Problem: GENITOURINARY - ADULT  Goal: Maintains or returns to baseline urinary function  Description: INTERVENTIONS:  - Assess urinary function  - Encourage oral fluids to ensure adequate hydration if ordered  - Administer IV fluids as ordered to ensure adequate hydration  - Administer ordered medications as needed  - Offer frequent toileting  - Follow urinary retention protocol if ordered  Outcome: Progressing     Problem: METABOLIC, FLUID AND ELECTROLYTES - ADULT  Goal: Electrolytes maintained within normal limits  Description: INTERVENTIONS:  - Monitor labs and assess patient for signs and symptoms of electrolyte imbalances  - Administer electrolyte replacement as ordered  - Monitor response to electrolyte replacements, including repeat lab results as appropriate  - Instruct patient on fluid and nutrition as appropriate  Outcome: Progressing

## 2025-07-17 NOTE — PLAN OF CARE
Problem: PAIN - ADULT  Goal: Verbalizes/displays adequate comfort level or baseline comfort level  Description: Interventions:  - Encourage patient to monitor pain and request assistance  - Assess pain using appropriate pain scale  - Administer analgesics as ordered based on type and severity of pain and evaluate response  - Implement non-pharmacological measures as appropriate and evaluate response  - Consider cultural and social influences on pain and pain management  - Notify physician/advanced practitioner if interventions unsuccessful or patient reports new pain  - Educate patient/family on pain management process including their role and importance of  reporting pain   - Provide non-pharmacologic/complimentary pain relief interventions  Outcome: Progressing     Problem: INFECTION - ADULT  Goal: Absence or prevention of progression during hospitalization  Description: INTERVENTIONS:  - Assess and monitor for signs and symptoms of infection  - Monitor lab/diagnostic results  - Monitor all insertion sites, i.e. indwelling lines, tubes, and drains  - Monitor endotracheal if appropriate and nasal secretions for changes in amount and color  - [place]lier appropriate cooling/warming therapies per order  - Administer medications as ordered  - Instruct and encourage patient and family to use good hand hygiene technique  - Identify and instruct in appropriate isolation precautions for identified infection/condition  Outcome: Progressing     Problem: SAFETY ADULT  Goal: Patient will remain free of falls  Description: INTERVENTIONS:  - Educate patient/family on patient safety including physical limitations  - Instruct patient to call for assistance with activity   - Consider consulting OT/PT to assist with strengthening/mobility based on AM PAC & JH-HLM score  - Consult OT/PT to assist with strengthening/mobility   - Keep Call bell within reach  - Keep bed low and locked with side rails adjusted as appropriate  - Keep  care items and personal belongings within reach  - Initiate and maintain comfort rounds  - Make Fall Risk Sign visible to staff  - Offer Toileting every . Hours, in advance of need  - Initiate/Maintain .alarm  - Obtain necessary fall risk management equipment: ..  - Apply yellow socks and bracelet for high fall risk patients  - Consider moving patient to room near nurses station  Outcome: Progressing  Goal: Maintain or return to baseline ADL function  Description: INTERVENTIONS:  -  Assess patient's ability to carry out ADLs; assess patient's baseline for ADL function and identify physical deficits which impact ability to perform ADLs (bathing, care of mouth/teeth, toileting, grooming, dressing, etc.)  - Assess/evaluate cause of self-care deficits   - Assess range of motion  - Assess patient's mobility; develop plan if impaired  - Assess patient's need for assistive devices and provide as appropriate  - Encourage maximum independence but intervene and supervise when necessary  - Involve family in performance of ADLs  - Assess for home care needs following discharge   - Consider OT consult to assist with ADL evaluation and planning for discharge  - Provide patient education as appropriate  - Monitor functional capacity and physical performance, use of AM PAC & -HLM   - Monitor gait, balance and fatigue with ambulation    Outcome: Progressing  Goal: Maintains/Returns to pre admission functional level  Description: INTERVENTIONS:  - Perform AM-PAC 6 Click Basic Mobility/ Daily Activity assessment daily.  - Set and communicate daily mobility goal to care team and patient/family/caregiver.   - Collaborate with rehabilitation services on mobility goals if consulted  - Perform Range of Motion . times a day.  - Reposition patient every . hours.  - Dangle patient . times a day  - Stand patient . times a day  - Ambulate patient . times a day  - Out of bed to chair .. times a day   - Out of bed for meals . times a day  - Out  of bed for toileting  - Record patient progress and toleration of activity level   Outcome: Progressing     Problem: DISCHARGE PLANNING  Goal: Discharge to home or other facility with appropriate resources  Description: INTERVENTIONS:  - Identify barriers to discharge w/patient and caregiver  - Arrange for needed discharge resources and transportation as appropriate  - Identify discharge learning needs (meds, wound care, etc.)  - Arrange for interpretive services to assist at discharge as needed  - Refer to Case Management Department for coordinating discharge planning if the patient needs post-hospital services based on physician/advanced practitioner order or complex needs related to functional status, cognitive ability, or social support system  Outcome: Progressing     Problem: Knowledge Deficit  Goal: Patient/family/caregiver demonstrates understanding of disease process, treatment plan, medications, and discharge instructions  Description: Complete learning assessment and assess knowledge base.  Interventions:  - Provide teaching at level of understanding  - Provide teaching via preferred learning methods  Outcome: Progressing     Problem: GENITOURINARY - ADULT  Goal: Maintains or returns to baseline urinary function  Description: INTERVENTIONS:  - Assess urinary function  - Encourage oral fluids to ensure adequate hydration if ordered  - Administer IV fluids as ordered to ensure adequate hydration  - Administer ordered medications as needed  - Offer frequent toileting  - Follow urinary retention protocol if ordered  Outcome: Progressing  Goal: Absence of urinary retention  Description: INTERVENTIONS:  - Assess patient’s ability to void and empty bladder  - Monitor I/O  - Bladder scan as needed  - Discuss with physician/AP medications to alleviate retention as needed  - Discuss catheterization for long term situations as appropriate  Outcome: Progressing  Goal: Urinary catheter remains patent  Description:  INTERVENTIONS:  - Assess patency of urinary catheter  - If patient has a chronic gonzales, consider changing catheter if non-functioning  - Follow guidelines for intermittent irrigation of non-functioning urinary catheter  Outcome: Progressing     Problem: METABOLIC, FLUID AND ELECTROLYTES - ADULT  Goal: Electrolytes maintained within normal limits  Description: INTERVENTIONS:  - Monitor labs and assess patient for signs and symptoms of electrolyte imbalances  - Administer electrolyte replacement as ordered  - Monitor response to electrolyte replacements, including repeat lab results as appropriate  - Instruct patient on fluid and nutrition as appropriate  Outcome: Progressing  Goal: Fluid balance maintained  Description: INTERVENTIONS:  - Monitor labs   - Monitor I/O and WT  - Instruct patient on fluid and nutrition as appropriate  - Assess for signs & symptoms of volume excess or deficit  Outcome: Progressing  Goal: Glucose maintained within target range  Description: INTERVENTIONS:  - Monitor Blood Glucose as ordered  - Assess for signs and symptoms of hyperglycemia and hypoglycemia  - Administer ordered medications to maintain glucose within target range  - Assess nutritional intake and initiate nutrition service referral as needed  Outcome: Progressing

## 2025-07-17 NOTE — ASSESSMENT & PLAN NOTE
Leukocytosis, tachycardia, tachypnea  Lactic acid 6.8 > 2.7 > 1.7  Leukocytosis improving  Secondary to obstructing ureteral stone  No evidence of pyuria/complicated UTI  No evidence of infection therefore will monitor patient off antibiotics  Monitor fever curve, leukocytosis

## 2025-07-17 NOTE — ASSESSMENT & PLAN NOTE
64 year old PMH chronic back pain who presents with acute onset right flank pain at 4 PM with difficulty urinating and flank pain.  No prior history kidney stones.    CT scan Fullness of the right renal pelvis with relative hyperdensity. Suspect infectious or hemorrhagic etiology. No hydroureter. Punctate calculus suspected in the distal ureter.   Leukocytosis resolved, creatinine 1.2  Urinalysis notable for hematuria, not suspicions for infection,negative nitrites no WBC, occasional bacteria.    Stone passed and collected 7/17. asymptomatic        Plan:   Patient is urologically stable and ok for discharge once approved per primary team  Send for stone analysis, order placed and communicated with nursing team  Adequate hydration, low oxalate diet reviewed  Will need to continue monitoring hematuria.  Discussed utility of office cystoscopy, patient would like surveillance. If continued hematuria after passage of calculus, would consider cystoscopic examination for complete urologic care.  OP urology follow up 3-4 weeks with KUB one hour prior to appointment, order in chart, office tasked with follow up appt

## 2025-07-17 NOTE — H&P
H&P - Hospitalist   Name: Yassine Capone 64 y.o. male I MRN: [id number]  Unit/Bed#: -01 I Date of Admission: 7/16/2025   Date of Service: 7/17/2025 I Hospital Day: 1     Assessment & Plan  Right ureteral stone  Presented from home with sats right flank pain that began at 4 PM, now pain is radiating to right groin  No history of renal stones  CT abdomen pelvis with right distal ureteral stone  UA without pyuria/bacteriuria  Continue pain control  N.p.o. with IV hydration  Appreciate urology consultation  Strain all urine, Flomax  Severe systemic inflammatory response syndrome (SIRS) (HCC)  Leukocytosis, tachycardia, tachypnea  Lactic acid 6.8, repeat 2.7  Secondary to obstructing ureteral stone  No evidence of pyuria/complicated UTI  Monitor fever curve, leukocytosis  Hypertensive crisis  Significantly elevated blood pressures at time of ED presentation, improved with pain control  No history of hypertension  Trend blood pressures      VTE Pharmacologic Prophylaxis:   High Risk (Score >/= 5) - Pharmacological DVT Prophylaxis Contraindicated. Sequential Compression Devices Ordered.  Code Status: Level 1 - Full Code   Discussion with family: Patient declined call to .     Anticipated Length of Stay: Patient will be admitted on an inpatient basis with an anticipated length of stay of greater than 2 midnights secondary to ureteral stone.    History of Present Illness   Chief Complaint: Right flank pain    Yassine Capone is a 64 y.o. male with a PMH of chronic back pain who presents with acute onset right flank pain at 4 PM.  CT imaging revealing distal ureteral stone.  Appreciate urology consultation, no pyuria/bacteriuria to indicate complicated UTI.  Denies history of renal stones.    Review of Systems   Constitutional:  Negative for chills and fever.   HENT:  Negative for ear pain and sore throat.    Eyes:  Negative for pain and visual disturbance.   Respiratory:  Negative for cough and shortness of  breath.    Cardiovascular:  Negative for chest pain and palpitations.   Gastrointestinal:  Negative for abdominal pain and vomiting.   Genitourinary:  Positive for difficulty urinating and flank pain. Negative for dysuria and hematuria.   Musculoskeletal:  Negative for arthralgias and back pain.   Skin:  Negative for color change and rash.   Neurological:  Negative for seizures and syncope.   All other systems reviewed and are negative.      Historical Information   Past Medical History[1]  Past Surgical History[2]  Social History[3]  E-Cigarette/Vaping    E-Cigarette Use Never User      E-Cigarette/Vaping Substances     Cardiac history - parents and siblings  Social History:  Marital Status: Single   Occupation: Contractor  Patient Pre-hospital Living Situation: Home  Patient Pre-hospital Level of Mobility: walks  Patient Pre-hospital Diet Restrictions: None    Meds/Allergies   I have reviewed home medications with patient personally.  Prior to Admission medications    Medication Sig Start Date End Date Taking? Authorizing Provider   diazepam (VALIUM) 5 mg tablet Take 1-2 tablets (5-10 mg total) by mouth every 6 (six) hours as needed for muscle spasms for up to 10 days 9/17/24 7/16/25  Terence Crisostomo, DO     Allergies   Allergen Reactions    Penicillins Other (See Comments)       Objective :  Temp:  [97.3 °F (36.3 °C)-97.8 °F (36.6 °C)] 97.3 °F (36.3 °C)  HR:  [72-96] 96  BP: (167-194)/(85-93) 167/90  Resp:  [18-24] 18  SpO2:  [96 %-100 %] 96 %  O2 Device: None (Room air)    Physical Exam  Vitals and nursing note reviewed.   Constitutional:       General: He is not in acute distress.     Appearance: He is well-developed.   HENT:      Head: Normocephalic and atraumatic.      Mouth/Throat:      Mouth: Mucous membranes are dry.     Eyes:      Conjunctiva/sclera: Conjunctivae normal.       Cardiovascular:      Rate and Rhythm: Normal rate and regular rhythm.      Heart sounds: No murmur heard.  Pulmonary:      Effort:  "Pulmonary effort is normal. No respiratory distress.      Breath sounds: Normal breath sounds.   Abdominal:      Palpations: Abdomen is soft.      Tenderness: There is abdominal tenderness. There is right CVA tenderness.     Musculoskeletal:         General: No swelling.      Cervical back: Neck supple.     Skin:     General: Skin is warm and dry.      Capillary Refill: Capillary refill takes less than 2 seconds.     Neurological:      General: No focal deficit present.      Mental Status: He is alert and oriented to person, place, and time.     Psychiatric:         Mood and Affect: Mood normal.        Lab Results: I have reviewed the following results:  Results from last 7 days   Lab Units 07/17/25  0506 07/16/25  1855   WBC Thousand/uL 17.92* 18.21*   HEMOGLOBIN g/dL 16.1 15.6   HEMATOCRIT % 47.6 45.9   PLATELETS Thousands/uL 230 276   SEGS PCT %  --  85*   LYMPHO PCT %  --  11*   MONO PCT %  --  3*   EOS PCT %  --  0     Results from last 7 days   Lab Units 07/17/25  0506 07/16/25  1855   SODIUM mmol/L 134* 138   POTASSIUM mmol/L 5.2 3.6   CHLORIDE mmol/L 104 100   CO2 mmol/L 23 20*   BUN mg/dL 20 22   CREATININE mg/dL 1.26 1.22   ANION GAP mmol/L 7 18*   CALCIUM mg/dL 8.8 9.6   ALBUMIN g/dL  --  4.7   TOTAL BILIRUBIN mg/dL  --  0.73   ALK PHOS U/L  --  60   ALT U/L  --  15   AST U/L  --  16   GLUCOSE RANDOM mg/dL 148* 192*             No results found for: \"HGBA1C\"  Results from last 7 days   Lab Units 07/17/25  0043 07/16/25  1913 07/16/25  1855   LACTIC ACID mmol/L 2.7* 6.8*  --    PROCALCITONIN ng/ml  --   --  <0.05       Imaging Results Review: I reviewed radiology reports from this admission including: CT abdomen/pelvis.  Other Study Results Review: No additional pertinent studies reviewed.        ** Please Note: This note has been constructed using a voice recognition system. **         [1] No past medical history on file.  [2] No past surgical history on file.  [3]   Social History  Tobacco Use    Smoking " status: Never    Smokeless tobacco: Never   Vaping Use    Vaping status: Never Used   Substance and Sexual Activity    Alcohol use: Not Currently    Drug use: Never

## 2025-07-17 NOTE — UTILIZATION REVIEW
Initial Clinical Review    Admission: Date/Time/Statement:   Admission Orders (From admission, onward)       Ordered        07/16/25 2025  INPATIENT ADMISSION  Once                          Orders Placed This Encounter   Procedures    INPATIENT ADMISSION     Standing Status:   Standing     Number of Occurrences:   1     Level of Care:   Med Surg [16]     Estimated length of stay:   More than 2 Midnights     Certification:   I certify that inpatient services are medically necessary for this patient for a duration of greater than two midnights. See H&P and MD Progress Notes for additional information about the patient's course of treatment.     ED Arrival Information       Expected   -    Arrival   7/16/2025 17:06    Acuity   Urgent              Means of arrival   Walk-In    Escorted by   Family Member    Service   Hospitalist    Admission type   Emergency              Arrival complaint   Flank pain             Chief Complaint   Patient presents with    Flank Pain     Right flank pain started around 1600 hrs today states it hit him like a ton of bricks.        Initial Presentation:  64 yom to ER from home for sudden onset R flank pain with associated nausea, chills. Hx chronic back pain. Presents hypertensive, difficulty urinating, s/s as above. Admission CT renal stone study, a/p: Fullness of the right renal pelvis with relative hyperdensity. Suspect infectious or hemorrhagic etiology. No hydroureter. Punctate calculus suspected in the distal ureter. Labs: WBC 18.21, CO2 20, anion gap 18, gluc 192, lactic acid 6.8, u/a+ketones, blood, RBC, occas bacteria.  Admitted to inpatient status for R ureteral stone. Plan: IVF, Flomax, urology consult, strain urine, pain mgt,      Anticipated Length of Stay/Certification Statement:   Patient will be admitted on an inpatient basis with an anticipated length of stay of greater than 2 midnights secondary to ureteral stone.     Date: 7/17/25   Day 2:   IVF, Flomax, straining urine  continued for R ureteral stone POA. Urology following, no surgical intervention at this time. Continue to monitor output, pain mgt, monitor VS, follow labs.    Per urology: R ureteral stone  Patient is urologically stable. No urgent need for urologic intervention. Adequate hydration, analgesics, antibiotics. Strain all urine. Flomax 0.4mg daily. Will need to continue monitoring hematuria        ED Treatment-Medication Administration from 07/16/2025 1705 to 07/16/2025 2047         Date/Time Order Dose Route Action     07/16/2025 1910 fentaNYL injection 50 mcg 50 mcg Intravenous Given     07/16/2025 1909 ondansetron (ZOFRAN) injection 4 mg 4 mg Intravenous Given     07/16/2025 1912 sodium chloride 0.9 % bolus 1,000 mL 1,000 mL Intravenous New Bag     07/16/2025 1925 cefTRIAXone (ROCEPHIN) IVPB (premix in dextrose) 1,000 mg 50 mL 1,000 mg Intravenous New Bag     07/16/2025 1924 fentaNYL injection 50 mcg 50 mcg Intravenous Given     07/16/2025 1959 ondansetron (ZOFRAN) injection 4 mg 4 mg Intravenous Given     07/16/2025 2018 HYDROmorphone (DILAUDID) injection 0.5 mg 0.5 mg Intravenous Given     07/16/2025 2016 sodium chloride 0.9 % bolus 500 mL 500 mL Intravenous New Bag            Scheduled Medications:  Medications 07/09 07/10 07/11 07/12 07/13 07/14 07/15 07/16 07/17 07/18   acetaminophen (TYLENOL) tablet 975 mg  Dose: 975 mg  Freq: Every 8 hours scheduled Route: PO  Start: 07/16/25 2200           2222      0630     1416     2057           ([id number]) 0428     2200        cefTRIAXone (ROCEPHIN) IVPB (premix in dextrose) 1,000 mg 50 mL  Dose: 1,000 mg  Freq: Every 24 hours Route: IV  Last Dose: 1,000 mg (07/18/25 0923)  Start: 07/18/25 0930   Admin Instructions:      Order specific questions:                0923        cefTRIAXone (ROCEPHIN) IVPB (premix in dextrose) 1,000 mg 50 mL  Dose: 1,000 mg  Freq: Once Route: IV  Last Dose: Stopped (07/16/25 2000)  Start: 07/16/25 1915 End: 07/16/25 2000   Admin Instructions:       Order specific questions:              1925 [C]     2000          enoxaparin (LOVENOX) subcutaneous injection 40 mg  Dose: 40 mg  Freq: Daily Route: SC  Start: 07/18/25 0900 End: 07/17/25 0627   Admin Instructions:               0627-D/C'd       famotidine (PEPCID) tablet 20 mg  Dose: 20 mg  Freq: Daily at bedtime Route: PO  Start: 07/16/25 2230           2304      2057           2200        fentaNYL injection 50 mcg  Dose: 50 mcg  Freq: Once Route: IV  Start: 07/16/25 1930 End: 07/16/25 1924   Admin Instructions:              1924          fentaNYL injection 50 mcg  Dose: 50 mcg  Freq: Once Route: IV  Start: 07/16/25 1915 End: 07/16/25 1910   Admin Instructions:              1910          HYDROmorphone (DILAUDID) injection 0.5 mg  Dose: 0.5 mg  Freq: Once Route: IV  Start: 07/16/25 2145 End: 07/16/25 2139   Admin Instructions:              2139          HYDROmorphone (DILAUDID) injection 0.5 mg  Dose: 0.5 mg  Freq: Once Route: IV  Start: 07/16/25 2015 End: 07/16/25 2018   Admin Instructions:              2018          lactated ringers bolus 500 mL  Dose: 500 mL  Freq: Once Route: IV  Last Dose: 500 mL (07/16/25 2117)  Start: 07/16/25 2030 End: 07/16/25 2217 2117          ondansetron (ZOFRAN) injection 4 mg  Dose: 4 mg  Freq: Once Route: IV  Start: 07/16/25 2000 End: 07/16/25 1959   Admin Instructions:              1959          ondansetron (ZOFRAN) injection 4 mg  Dose: 4 mg  Freq: Once Route: IV  Start: 07/16/25 1915 End: 07/16/25 1909   Admin Instructions:              1909          oxybutynin (DITROPAN) tablet 5 mg  Dose: 5 mg  Freq: 3 times daily Route: PO  Start: 07/16/25 2245   Admin Instructions:              2304      0848     1539     2057      0823     1600     2100        sodium chloride 0.9 % bolus 1,000 mL  Dose: 1,000 mL  Freq: Once Route: IV  Last Dose: Stopped (07/16/25 2016)  Start: 07/16/25 1915 End: 07/16/25 2016 1912 2016          sodium chloride 0.9 % bolus 500  mL  Dose: 500 mL  Freq: Once Route: IV  Last Dose: 500 mL (07/16/25 2016)  Start: 07/16/25 2015 End: 07/16/25 2116 2016          tamsulosin (FLOMAX) capsule 0.4 mg  Dose: 0.4 mg  Freq: Daily with dinner Route: PO  Start: 07/17/25 1630   Admin Instructions:               1539      1630                    Continuous Meds Sorted by Name  for Yassine Capone as of 07/09/25 through 7/18/25  Legend:       Medications 07/09 07/10 07/11 07/12 07/13 07/14 07/15 07/16 07/17 07/18   lactated ringers infusion  Rate: 100 mL/hr Dose: 100 mL/hr  Freq: Continuous Route: IV  Indications of Use: IV Hydration  Last Dose: Stopped (07/18/25 0923)  Start: 07/16/25 2230 End: 07/18/25 0911           2304      0848     1824      0450     0911-D/C'd  0923 [C]        Legend:       [id number]/1207/1307/1407/1507/1607/1707/18        PRN Meds Sorted by Name  for Yassine Capone as of 07/09/25 through 7/18/25  Legend:       Medications 07/09 07/10 07/11 07/12 07/13 07/14 07/15 07/16 07/17 07/18   aluminum-magnesium hydroxide-simethicone (MAALOX) oral suspension 30 mL  Dose: 30 mL  Freq: Every 4 hours PRN Route: PO  PRN Reasons: indigestion,heartburn  Start: 07/17/25 1401   Admin Instructions:               1417         HYDROmorphone (DILAUDID) injection 0.5 mg  Dose: 0.5 mg  Freq: Every 3 hours PRN Route: IV  PRN Reason: breakthrough pain  Start: 07/16/25 2140   Admin Instructions:                   ondansetron (ZOFRAN) injection 4 mg  Dose: 4 mg  Freq: Every 6 hours PRN Route: IV  PRN Reasons: nausea,vomiting  Start: 07/16/25 2227   Admin Instructions:                   oxybutynin (DITROPAN) tablet 5 mg  Dose: 5 mg  Freq: 3 times daily PRN Route: PO  PRN Comment: bladder spasm  Start: 07/16/25 2027 End: 07/16/25 2230   Admin Instructions:              2230-D/C'd        oxyCODONE (ROXICODONE) IR tablet 2.5 mg  Dose: 2.5 mg  Freq: Every 4 hours PRN Route: PO  PRN Reason: moderate pain  Start: 07/16/25 2139   Admin  Instructions:               1031         oxyCODONE (ROXICODONE) IR tablet 5 mg  Dose: 5 mg  Freq: Every 4 hours PRN Route: PO  PRN Reason: severe pain  Start: 07/16/25 2139   Admin Instructions:              2226      0309     1142     1803                         ED Triage Vitals [07/16/25 1721]   Temperature Pulse Respirations Blood Pressure SpO2 Pain Score   97.8 °F (36.6 °C) 72 18 (!) 192/85 100 % 10 - Worst Possible Pain     Weight (last 2 days)       Date/Time Weight    07/16/25 2052 88.8 (195.77)    07/16/25 1721 90.2 (198.86)            Vital Signs (last 3 days)       Date/Time Temp Pulse Resp BP MAP (mmHg) SpO2 O2 Device Patient Position - Orthostatic VS Dhaval Coma Scale Score Pain    07/18/25 0823 -- -- -- -- -- 97 % None (Room air) -- 15 No Pain    07/18/25 07:05:28 -- 74 16 133/71 92 100 % -- -- -- --    07/18/25 0509 -- -- -- -- -- -- -- -- -- No Pain    07/17/25 2057 -- -- -- -- -- -- -- -- -- 2    07/17/25 2000 -- -- -- -- -- 97 % None (Room air) -- 15 --    07/17/25 1803 -- -- -- -- -- -- -- -- -- 3    07/17/25 15:07:38 97.2 °F (36.2 °C) 69 18 153/80 104 96 % -- -- -- --    07/17/25 1416 -- -- -- -- -- -- -- -- -- 3    07/17/25 1142 -- -- -- -- -- -- -- -- -- 8    07/17/25 1031 -- -- -- -- -- 96 % None (Room air) -- 15 5    07/17/25 07:01:38 97.2 °F (36.2 °C) 61 16 157/85 109 97 % -- -- -- --    07/17/25 0630 -- -- -- -- -- -- -- -- -- Med Not Given for Pain - for MAR use only    07/17/25 0309 -- -- -- -- -- -- -- -- -- 8 07/16/25 2226 -- -- -- -- -- -- -- -- -- 8 07/16/25 2222 -- -- -- -- -- -- -- -- -- Med Not Given for Pain - for MAR use only    07/16/25 2139 -- -- -- -- -- -- -- -- -- 7 07/16/25 21:00:24 97.3 °F (36.3 °C) 96 18 167/90 116 96 % -- -- -- --    07/16/25 2100 -- -- -- -- -- 96 % None (Room air) -- 15 2 07/16/25 2018 -- -- -- -- -- -- -- -- -- 6 07/16/25 2000 -- 89 24 181/85 122 97 % None (Room air) Lying -- --    07/16/25 1924 -- -- -- -- -- -- -- -- -- 9 07/16/25  "1910 -- -- -- -- -- -- -- -- -- 10 - Worst Possible Pain    07/16/25 1902 -- -- -- -- -- -- -- -- 15 --    07/16/25 1900 -- 86 20 194/93 133 100 % None (Room air) Lying -- --    07/16/25 1721 97.8 °F (36.6 °C) 72 18 192/85 115 100 % None (Room air) Sitting -- 10 - Worst Possible Pain              Pertinent Labs/Diagnostic Test Results:   Radiology:  CT renal stone study abdomen pelvis wo contrast   Final Interpretation by Toni Plascencia MD (07/16 1807)      Fullness of the right renal pelvis with relative hyperdensity. Suspect infectious or hemorrhagic etiology.   No hydroureter. Punctate calculus suspected in the distal ureter. Correlate with urinalysis follow-up per urology.         Workstation performed: [id number]           Cardiology:  No orders to display     GI:  No orders to display           Results from last 7 days   Lab Units 07/18/25 0454 07/17/25  0506 07/16/25  1855   WBC Thousand/uL 9.74 17.92* 18.21*   HEMOGLOBIN g/dL 13.0 16.1 15.6   HEMATOCRIT % 39.4 47.6 45.9   PLATELETS Thousands/uL 184 230 276   TOTAL NEUT ABS Thousands/µL 7.07  --  15.41*         Results from last 7 days   Lab Units 07/18/25 0454 07/17/25  0506 07/16/25  1855   SODIUM mmol/L 138 134* 138   POTASSIUM mmol/L 4.3 5.2 3.6   CHLORIDE mmol/L 105 104 100   CO2 mmol/L 29 23 20*   ANION GAP mmol/L 4 7 18*   BUN mg/dL 17 20 22   CREATININE mg/dL 1.28 1.26 1.22   EGFR ml/min/1.73sq m 57 59 61   CALCIUM mg/dL 8.6 8.8 9.6   MAGNESIUM mg/dL  --  1.9  --      Results from last 7 days   Lab Units 07/16/25  1855   AST U/L 16   ALT U/L 15   ALK PHOS U/L 60   TOTAL PROTEIN g/dL 7.7   ALBUMIN g/dL 4.7   TOTAL BILIRUBIN mg/dL 0.73         Results from last 7 days   Lab Units 07/18/25  0454 07/17/25  0506 07/16/25  1855   GLUCOSE RANDOM mg/dL 106 148* 192*             No results found for: \"BETA-HYDROXYBUTYRATE\"                                   Results from last 7 days   Lab Units 07/16/25  1855   PROCALCITONIN ng/ml <0.05     Results " from last 7 days   Lab Units 07/17/25  0928 07/17/25  0043 07/16/25  1913   LACTIC ACID mmol/L 1.7 2.7* 6.8*                                 Results from last 7 days   Lab Units 07/16/25  1855   LIPASE u/L 14                 Results from last 7 days   Lab Units 07/16/25  1913   CLARITY UA  Clear   COLOR UA  Yellow   SPEC GRAV UA  >=1.030   PH UA  6.0   GLUCOSE UA mg/dl Negative   KETONES UA mg/dl Trace*   BLOOD UA  Large*   PROTEIN UA mg/dl Negative   NITRITE UA  Negative   BILIRUBIN UA  Negative   UROBILINOGEN UA E.U./dl 0.2   LEUKOCYTES UA  Negative   WBC UA /hpf 0-5   RBC UA /hpf 10-20*   BACTERIA UA /hpf Occasional   EPITHELIAL CELLS WET PREP /hpf Occasional                                 Results from last 7 days   Lab Units 07/16/25  1921 07/16/25  1848   BLOOD CULTURE  Received in Microbiology Lab. Culture in Progress. Received in Microbiology Lab. Culture in Progress.                   Past Medical History[1]  Present on Admission:  **None**      Admitting Diagnosis: Ureteral stone [N20.1]  Flank pain [R10.9]  Sepsis (HCC) [A41.9]  Age/Sex: 64 y.o. male    Network Utilization Review Department  ATTENTION: Please call with any questions or concerns to [phone] and carefully listen to the prompts so that you are directed to the right person. All voicemails are confidential.   For Discharge needs, contact Care Management DC Support Team at [phone] opt. [phone]  Send all requests for admission clinical reviews, approved or denied determinations and any other requests to dedicated fax number below belonging to the campus where the patient is receiving treatment. List of dedicated fax numbers for the Facilities:  FACILITY NAME UR FAX NUMBER   ADMISSION DENIALS (Administrative/Medical Necessity) [phone]   DISCHARGE SUPPORT TEAM (NETWORK) [phone]   PARENT CHILD HEALTH (Maternity/NICU/Pediatrics) [phone]   [facility] [phone]   [facility]  [phone]   [facility] [phone]   [facility] [phone]   [facility] [phone]   [facility] [phone]   [facility] [phone]   [facility] [phone]   [facility] [phone]   [facility] [phone]   [facility] [phone]   [facility] [phone]              [1] No past medical history on file.

## 2025-07-17 NOTE — ASSESSMENT & PLAN NOTE
Significantly elevated blood pressures at time of ED presentation, improved with pain control  No history of hypertension  Trend blood pressures

## 2025-07-17 NOTE — ASSESSMENT & PLAN NOTE
Presented from home with sats right flank pain that began at 4 PM, now pain is radiating to right groin  No history of renal stones  CT abdomen pelvis with right distal ureteral stone  UA without pyuria/bacteriuria  Continue pain control  N.p.o. with IV hydration  Appreciate urology consultation  Strain all urine, Flomax

## 2025-07-17 NOTE — ASSESSMENT & PLAN NOTE
Presented from home with sats right flank pain that began at 4 PM, now pain is radiating to right groin  No history of renal stones  CT abdomen pelvis with right distal ureteral stone  UA without pyuria/bacteriuria  Continue pain control  Patient seen by urology, no plan for urgent urologic intervention  Strain all urine  Continue Flomax

## 2025-07-17 NOTE — ASSESSMENT & PLAN NOTE
Significantly elevated blood pressures at time of ED presentation, improved with pain control  No history of hypertension  Trend blood pressures  Blood pressure improved

## 2025-07-17 NOTE — PROGRESS NOTES
Progress Note - Hospitalist   Name: Yassine Capone 64 y.o. male I MRN: [id number]  Unit/Bed#: -01 I Date of Admission: 7/16/2025   Date of Service: 7/17/2025 I Hospital Day: 1    Assessment & Plan  Right ureteral stone  Presented from home with sats right flank pain that began at 4 PM, now pain is radiating to right groin  No history of renal stones  CT abdomen pelvis with right distal ureteral stone  UA without pyuria/bacteriuria  Continue pain control  Patient seen by urology, no plan for urgent urologic intervention  Strain all urine  Continue Flomax  Severe systemic inflammatory response syndrome (SIRS) (HCC)  Leukocytosis, tachycardia, tachypnea  Lactic acid 6.8 > 2.7 > 1.7  Leukocytosis improving  Secondary to obstructing ureteral stone  No evidence of pyuria/complicated UTI  No evidence of infection therefore will monitor patient off antibiotics  Monitor fever curve, leukocytosis  Hypertensive crisis  Significantly elevated blood pressures at time of ED presentation, improved with pain control  No history of hypertension  Trend blood pressures  Blood pressure improved    VTE Pharmacologic Prophylaxis:   Moderate Risk (Score 3-4) - Pharmacological DVT Prophylaxis Contraindicated. Sequential Compression Devices Ordered.    Mobility:   Basic Mobility Inpatient Raw Score: 24  JH-HLM Goal: 8: Walk 250 feet or more  JH-HLM Achieved: 8: Walk 250 feet ot more  JH-HLM Goal achieved. Continue to encourage appropriate mobility.    Patient Centered Rounds: I performed bedside rounds with nursing staff today.   Discussions with Specialists or Other Care Team Provider: CM, urology    Education and Discussions with Family / Patient: Updated  (daughter) at bedside.    Current Length of Stay: 1 day(s)  Current Patient Status: Inpatient   Certification Statement: The patient will continue to require additional inpatient hospital stay due to right ureteral stone  Discharge Plan: Anticipate discharge in 24-48  hrs to home.    Code Status: Level 1 - Full Code    Subjective   Patient seen and examined at bedside.  No acute events overnight.  Patient reports that his pain is well-controlled on current pain medication regimen.  He denies passing his stone.  Denies any fevers or chills.  Denies any nausea or vomiting.  No other acute complaints at this time.    Objective :  Temp:  [97.2 °F (36.2 °C)-97.8 °F (36.6 °C)] 97.2 °F (36.2 °C)  HR:  [61-96] 61  BP: (157-194)/(85-93) 157/85  Resp:  [16-24] 16  SpO2:  [96 %-100 %] 97 %  O2 Device: None (Room air)    Body mass index is 32.58 kg/m².     Input and Output Summary (last 24 hours):     Intake/Output Summary (Last 24 hours) at 7/17/2025 0840  Last data filed at 7/16/2025 2016  Gross per 24 hour   Intake 1050 ml   Output --   Net 1050 ml       Physical Exam  Vitals and nursing note reviewed.   Constitutional:       General: He is not in acute distress.     Appearance: He is obese. He is not toxic-appearing.   HENT:      Head: Normocephalic and atraumatic.     Eyes:      Extraocular Movements: Extraocular movements intact.      Pupils: Pupils are equal, round, and reactive to light.       Cardiovascular:      Rate and Rhythm: Normal rate and regular rhythm.   Pulmonary:      Effort: Pulmonary effort is normal. No respiratory distress.      Breath sounds: No wheezing.   Abdominal:      General: There is no distension.      Palpations: Abdomen is soft.      Tenderness: There is abdominal tenderness (Right sided abdominal tenderness).     Musculoskeletal:         General: No swelling.      Right lower leg: No edema.      Left lower leg: No edema.     Skin:     General: Skin is warm.     Neurological:      General: No focal deficit present.      Mental Status: He is alert and oriented to person, place, and time. Mental status is at baseline.     Psychiatric:         Mood and Affect: Mood normal.         Behavior: Behavior normal.           Lines/Drains:              Lab Results: I  have reviewed the following results:   Results from last 7 days   Lab Units 07/17/25  0506 07/16/25  1855   WBC Thousand/uL 17.92* 18.21*   HEMOGLOBIN g/dL 16.1 15.6   HEMATOCRIT % 47.6 45.9   PLATELETS Thousands/uL 230 276   SEGS PCT %  --  85*   LYMPHO PCT %  --  11*   MONO PCT %  --  3*   EOS PCT %  --  0     Results from last 7 days   Lab Units 07/17/25  0506 07/16/25  1855   SODIUM mmol/L 134* 138   POTASSIUM mmol/L 5.2 3.6   CHLORIDE mmol/L 104 100   CO2 mmol/L 23 20*   BUN mg/dL 20 22   CREATININE mg/dL 1.26 1.22   ANION GAP mmol/L 7 18*   CALCIUM mg/dL 8.8 9.6   ALBUMIN g/dL  --  4.7   TOTAL BILIRUBIN mg/dL  --  0.73   ALK PHOS U/L  --  60   ALT U/L  --  15   AST U/L  --  16   GLUCOSE RANDOM mg/dL 148* 192*                 Results from last 7 days   Lab Units 07/17/25  0043 07/16/25  1913 07/16/25  1855   LACTIC ACID mmol/L 2.7* 6.8*  --    PROCALCITONIN ng/ml  --   --  <0.05       Recent Cultures (last 7 days):               Last 24 Hours Medication List:     Current Facility-Administered Medications:     acetaminophen (TYLENOL) tablet 975 mg, Q8H ELISEO    famotidine (PEPCID) tablet 20 mg, HS    HYDROmorphone (DILAUDID) injection 0.5 mg, Q3H PRN    lactated ringers infusion, Continuous, Last Rate: 100 mL/hr (07/16/25 2304)    ondansetron (ZOFRAN) injection 4 mg, Q6H PRN    oxybutynin (DITROPAN) tablet 5 mg, TID    oxyCODONE (ROXICODONE) IR tablet 2.5 mg, Q4H PRN    oxyCODONE (ROXICODONE) IR tablet 5 mg, Q4H PRN    tamsulosin (FLOMAX) capsule 0.4 mg, Daily With Dinner    Administrative Statements   Today, Patient Was Seen By: Emma Calle MD      **Please Note: This note may have been constructed using a voice recognition system.**

## 2025-07-17 NOTE — CONSULTS
Inpatient consult to Urology  Consult performed by: TIFFANIE Isaacs  Consult ordered by: TIFFANIE Keller      : UROLOGY  Yassine PATEL Stump 64 y.o. male [id number]   Unit/Bed #: [facility]  Encounter: [id number]        Assessment  & Plan  :    * Right ureteral stone  Assessment & Plan  64 year old PMH chronic back pain who presents with acute onset right flank pain at 4 PM with difficulty urinating and flank pain.  No prior history kidney stones.    CT scan Fullness of the right renal pelvis with relative hyperdensity. Suspect infectious or hemorrhagic etiology. No hydroureter. Punctate calculus suspected in the distal ureter.   Admission lactic acid 6.8 with anion gap 18  WBC 17.9 (18.2)  Creatinine 1.26 (1.22)  Urinalysis not suspicious of infection with 10-20RBC, no WBC, occasional bacteria.    Received ceftriaxone in ED.   Psa in November 2024-- 2.27, defers DANIELLA    Plan:   Patient is urologically stable. No urgent need for urologic intervention.  If no ALISA, pain controlled, tolerating po intake and comfortable,can be discharged to pass kidney stone at home.  He understands he will need follow up to confirm stone is passed also understands need for urologic intervention if stone not passed with conservative measures. However, patient has elevated WBC count, will defer to primary team for final disposition  Adequate hydration, analgesics, antibiotics  Strain all urine  Flomax 0.4mg daily  Will need to continue monitoring hematuria.  Discussed utility of office cystoscopy, patient would like surveillance. If continued hematuria after passage of calculus, would consider cystoscopic examination for complete urologic care.  OP urology follow up 3-4 weeks with KUB one hour prior to appointment, order in chart, office tasked with follow up appt  Please reach out to Urology for any acute change in vital signs, decompensation, Alisa, nausea, vomiting, fever, chills, flank pain for re-evaluation to determine need for  urgent urologic intervention      Discussed options for managing retained ureteral calculi.  These include  medical expulsive therapy with medications such as alpha blockers along with pain relievers and surgical intervention with cystoscopy, ureteroscopy with laser lithotripsy.  He understands the goal is to remove or break up the stones that are large to pass, causing pain, infection or blocking urinary tract, restoring normal kidney function and relieve symptoms.      Risks of untreated kidney stones in the ureter can cause urinary tract infection due to blockage of urine flow.  If untreated this can lead to more serious infection like pyelonephritis or sepsis.  Ureteral damage due to prolonged irritation and inflammation, kidney damage due to long-term blockage of urine and chronic kidney disease if the blockage persists and is not treated            Subjective :    Yassine Capone  is a 64 y.o. male PMH chronic back pain who presents with acute onset right flank pain at 4 PM with difficulty urinating and flank pain.  Has had a vasectomy over 10 years ago. No prior history kidney stones or other urologic surgery. No family history prostate cancer, he is a non-smoker and  by trade without known exposure to petroleum or hazard chemicals.  CT scan Fullness of the right renal pelvis with relative hyperdensity. Suspect infectious or hemorrhagic etiology. No hydroureter. Punctate calculus suspected in the distal ureter. Admission labs include  WBC18.2, creatinine 1.22, Lactic acid 6.8, anion gap 18, urine with 10-20RBC, no WBC, occasional bacteria.  Received ceftriaxone in ED. Psa followed by PCP with result of 2.27 in November 2024, no DANIELLA performed.    He is afebrile, WBC remains elevated at 17.92. creatinine 1.26. continues with right flank and right lower quadrant pain.  States he is feeling better today.  Pain controlled to a 3/10 with narcotic pain medication.  Denies fever, chills, flank pain.  No hematuria, dysuria, frequency or urgency.  No prior issues with urination, flow or nocturia prior to arrival.  No hesitancy. Does have some post void dribbling.      Allergies[1]   No current outpatient medications   Past Medical History[2]  Past Surgical History[3]  Family History[4]  Social History[5]     Review of Systems   Constitutional:  Negative for activity change, chills and fever.   HENT: Negative.     Eyes: Negative.    Respiratory:  Negative for shortness of breath.    Gastrointestinal:  Negative for abdominal pain, nausea and vomiting.   Endocrine: Negative.    Genitourinary:  Positive for flank pain. Negative for difficulty urinating, dysuria, hematuria and urgency.   Musculoskeletal:  Negative for back pain and neck pain.   Skin: Negative.    Allergic/Immunologic: Negative.    Neurological:  Negative for dizziness and headaches.   Hematological: Negative.    Psychiatric/Behavioral: Negative.  Negative for behavioral problems.         Objective     Physical Exam  Vitals and nursing note reviewed.   Constitutional:       General: He is not in acute distress.     Appearance: Normal appearance. He is well-developed. He is not toxic-appearing or diaphoretic.   HENT:      Head: Normocephalic and atraumatic.      Right Ear: External ear normal.      Left Ear: External ear normal.      Nose: Nose normal.      Mouth/Throat:      Pharynx: Uvula midline.     Eyes:      Extraocular Movements: Extraocular movements intact.      Pupils: Pupils are equal, round, and reactive to light.       Cardiovascular:      Rate and Rhythm: Normal rate.      Heart sounds: No murmur heard.  Pulmonary:      Effort: Pulmonary effort is normal. No respiratory distress.   Abdominal:      Palpations: Abdomen is soft.      Tenderness: There is abdominal tenderness. There is right CVA tenderness. There is no left CVA tenderness.   Genitourinary:     Comments: Deferred per patient request      Musculoskeletal:         General: Normal  range of motion.      Cervical back: Normal range of motion and neck supple.     Skin:     General: Skin is warm and dry.      Findings: No rash.     Neurological:      General: No focal deficit present.      Mental Status: He is alert and oriented to person, place, and time. Mental status is at baseline.      GCS: GCS eye subscore is 4. GCS verbal subscore is 5. GCS motor subscore is 6.     Psychiatric:         Mood and Affect: Mood normal.         Behavior: Behavior normal. Behavior is cooperative.         Thought Content: Thought content normal.         Judgment: Judgment normal.                Imaging:  CT ABDOMEN AND PELVIS WITHOUT IV CONTRAST - LOW DOSE RENAL STONE     INDICATION: flank pain.     COMPARISON: None.     TECHNIQUE: Low radiation dose thin section CT examination of the abdomen and pelvis was performed without intravenous or oral contrast according to a protocol specifically designed to evaluate for urinary tract calculus. Axial, sagittal, and coronal 2D   reformatted images were created from the source data and submitted for interpretation. Evaluation for pathology in the abdomen and pelvis that is unrelated to urinary tract calculi is limited.     Radiation dose length product (DLP) for this visit: 340 mGy-cm. . This examination, like all CT scans performed in the [facility], was performed utilizing techniques to minimize radiation dose exposure, including the use of iterative   reconstruction and automated exposure control.     URINARY TRACT FINDINGS:     RIGHT KIDNEY AND URETER: Fullness of the right renal pelvis with relative hyperdensity. No hydroureter. Punctate calculus suspected in the distal ureter in series 301 image 139 and in series 201 image 157r     LEFT KIDNEY AND URETER: No urinary tract calculi. No hydronephrosis or hydroureter. Left kidney upper pole simple cyst.     URINARY BLADDER: Unremarkable.        ADDITIONAL FINDINGS:     LOWER CHEST: No clinically  significant abnormality in the visualized lower chest.     SOLID VISCERA: Limited low radiation dose noncontrast CT evaluation demonstrates no clinically significant abnormality of the imaged portions of the liver, spleen, pancreas, or adrenal glands.     GALLBLADDER/BILIARY TREE: Small calcified gallstones. No pericholecystic inflammatory change. No biliary dilation.     STOMACH AND BOWEL: Unremarkable.     APPENDIX: No findings to suggest appendicitis.     ABDOMINOPELVIC CAVITY: No ascites. No pneumoperitoneum. No lymphadenopathy.     VESSELS: Unremarkable for patient's age.     REPRODUCTIVE ORGANS: Unremarkable for patient's age.     ABDOMINAL WALL/INGUINAL REGIONS: Unremarkable.     BONES: Degenerative disc disease at L5-S1. No acute fracture or suspicious osseous lesion.     IMPRESSION:     Fullness of the right renal pelvis with relative hyperdensity. Suspect infectious or hemorrhagic etiology.  No hydroureter. Punctate calculus suspected in the distal ureter. Correlate with urinalysis follow-up per urology.        Workstation performed: [id number]        Imaging    CT renal stone study abdomen pelvis wo contrast (Order: [id number]) - 7/16/2025    Labs:  Lab Results   Component Value Date    SODIUM 134 (L) 07/17/2025    K 5.2 07/17/2025     07/17/2025    CO2 23 07/17/2025    BUN 20 07/17/2025    CREATININE 1.26 07/17/2025    GLUC 148 (H) 07/17/2025    CALCIUM 8.8 07/17/2025         Lab Results   Component Value Date    WBC 17.92 (H) 07/17/2025    HGB 16.1 07/17/2025    HCT 47.6 07/17/2025    MCV 92 07/17/2025     07/17/2025         VTE Pharmacologic Prophylaxis: VTE covered by:    None      VTE Mechanical Prophylaxis: sequential compression device    TIFFANIE Isaacs         [1]   Allergies  Allergen Reactions    Penicillins Other (See Comments)   [2] No past medical history on file.  [3] No past surgical history on file.  [4] No family history on file.  [5]   Social History  Socioeconomic  History    Marital status: Single   Tobacco Use    Smoking status: Never    Smokeless tobacco: Never   Vaping Use    Vaping status: Never Used   Substance and Sexual Activity    Alcohol use: Not Currently    Drug use: Never     Social Drivers of Health     Food Insecurity: No Food Insecurity (7/16/2025)    Nursing - Inadequate Food Risk Classification     Ran Out of Food in the Last Year: Never true   Transportation Needs: No Transportation Needs (7/16/2025)    Nursing - Transportation Risk Classification     Lack of Transportation: No   Intimate Partner Violence: Unknown (7/16/2025)    Nursing IPS     Physically Hurt by Someone: No     Hurt or Threatened by Someone: No   Housing Stability: Unknown (7/16/2025)    Nursing: Inadequate Housing Risk Classification     Unable to Pay for Housing in the Last Year: No     Has Housing: No

## 2025-07-17 NOTE — SEPSIS NOTE
Sepsis Note   Yassine A Stump 64 y.o. male MRN: [id number]  Unit/Bed#: ED 04 Encounter: [id number]                 Body mass index is 33.09 kg/m².  Wt Readings from Last 1 Encounters:   07/16/25 90.2 kg (198 lb 13.7 oz)     IBW (Ideal Body Weight): 61.5 kg    Ideal body weight: 61.5 kg (135 lb 9.3 oz)  Adjusted ideal body weight: 73 kg (160 lb 14.3 oz)

## 2025-07-18 VITALS
BODY MASS INDEX: 32.62 KG/M2 | SYSTOLIC BLOOD PRESSURE: 133 MMHG | TEMPERATURE: 97.2 F | DIASTOLIC BLOOD PRESSURE: 71 MMHG | RESPIRATION RATE: 16 BRPM | HEIGHT: 65 IN | WEIGHT: 195.77 LBS | OXYGEN SATURATION: 97 % | HEART RATE: 74 BPM

## 2025-07-18 LAB
ANION GAP SERPL CALCULATED.3IONS-SCNC: 4 MMOL/L (ref 4–13)
BACTERIA UR CULT: NORMAL
BASOPHILS # BLD AUTO: 0.05 THOUSANDS/ÂΜL (ref 0–0.1)
BASOPHILS NFR BLD AUTO: 1 % (ref 0–1)
BUN SERPL-MCNC: 17 MG/DL (ref 5–25)
CALCIUM SERPL-MCNC: 8.6 MG/DL (ref 8.4–10.2)
CHLORIDE SERPL-SCNC: 105 MMOL/L (ref 96–108)
CO2 SERPL-SCNC: 29 MMOL/L (ref 21–32)
CREAT SERPL-MCNC: 1.28 MG/DL (ref 0.6–1.3)
EOSINOPHIL # BLD AUTO: 0.14 THOUSAND/ÂΜL (ref 0–0.61)
EOSINOPHIL NFR BLD AUTO: 1 % (ref 0–6)
ERYTHROCYTE [DISTWIDTH] IN BLOOD BY AUTOMATED COUNT: 12.8 % (ref 11.6–15.1)
GFR SERPL CREATININE-BSD FRML MDRD: 57 ML/MIN/1.73SQ M
GLUCOSE SERPL-MCNC: 106 MG/DL (ref 65–140)
HCT VFR BLD AUTO: 39.4 % (ref 36.5–49.3)
HGB BLD-MCNC: 13 G/DL (ref 12–17)
IMM GRANULOCYTES # BLD AUTO: 0.04 THOUSAND/UL (ref 0–0.2)
IMM GRANULOCYTES NFR BLD AUTO: 0 % (ref 0–2)
LYMPHOCYTES # BLD AUTO: 1.91 THOUSANDS/ÂΜL (ref 0.6–4.47)
LYMPHOCYTES NFR BLD AUTO: 20 % (ref 14–44)
MCH RBC QN AUTO: 30.9 PG (ref 26.8–34.3)
MCHC RBC AUTO-ENTMCNC: 33 G/DL (ref 31.4–37.4)
MCV RBC AUTO: 94 FL (ref 82–98)
MONOCYTES # BLD AUTO: 0.53 THOUSAND/ÂΜL (ref 0.17–1.22)
MONOCYTES NFR BLD AUTO: 5 % (ref 4–12)
NEUTROPHILS # BLD AUTO: 7.07 THOUSANDS/ÂΜL (ref 1.85–7.62)
NEUTS SEG NFR BLD AUTO: 73 % (ref 43–75)
NRBC BLD AUTO-RTO: 0 /100 WBCS
PLATELET # BLD AUTO: 184 THOUSANDS/UL (ref 149–390)
PMV BLD AUTO: 9.3 FL (ref 8.9–12.7)
POTASSIUM SERPL-SCNC: 4.3 MMOL/L (ref 3.5–5.3)
RBC # BLD AUTO: 4.21 MILLION/UL (ref 3.88–5.62)
SODIUM SERPL-SCNC: 138 MMOL/L (ref 135–147)
WBC # BLD AUTO: 9.74 THOUSAND/UL (ref 4.31–10.16)

## 2025-07-18 PROCEDURE — 82360 CALCULUS ASSAY QUANT: CPT

## 2025-07-18 PROCEDURE — 99239 HOSP IP/OBS DSCHRG MGMT >30: CPT | Performed by: FAMILY MEDICINE

## 2025-07-18 PROCEDURE — 80048 BASIC METABOLIC PNL TOTAL CA: CPT | Performed by: STUDENT IN AN ORGANIZED HEALTH CARE EDUCATION/TRAINING PROGRAM

## 2025-07-18 PROCEDURE — 99232 SBSQ HOSP IP/OBS MODERATE 35: CPT

## 2025-07-18 PROCEDURE — 85025 COMPLETE CBC W/AUTO DIFF WBC: CPT | Performed by: STUDENT IN AN ORGANIZED HEALTH CARE EDUCATION/TRAINING PROGRAM

## 2025-07-18 RX ORDER — TAMSULOSIN HYDROCHLORIDE 0.4 MG/1
0.4 CAPSULE ORAL
Qty: 7 CAPSULE | Refills: 0 | Status: SHIPPED | OUTPATIENT
Start: 2025-07-18 | End: 2025-07-25

## 2025-07-18 RX ORDER — CEPHALEXIN 500 MG/1
500 CAPSULE ORAL EVERY 12 HOURS SCHEDULED
Qty: 10 CAPSULE | Refills: 0 | Status: SHIPPED | OUTPATIENT
Start: 2025-07-18 | End: 2025-07-23

## 2025-07-18 RX ORDER — ACETAMINOPHEN 325 MG/1
650 TABLET ORAL EVERY 6 HOURS PRN
Start: 2025-07-18

## 2025-07-18 RX ORDER — CEFTRIAXONE 1 G/50ML
1000 INJECTION, SOLUTION INTRAVENOUS EVERY 24 HOURS
Status: DISCONTINUED | OUTPATIENT
Start: 2025-07-18 | End: 2025-07-18 | Stop reason: HOSPADM

## 2025-07-18 RX ADMIN — CEFTRIAXONE 1000 MG: 1 INJECTION, SOLUTION INTRAVENOUS at 09:23

## 2025-07-18 RX ADMIN — SODIUM CHLORIDE, SODIUM LACTATE, POTASSIUM CHLORIDE, AND CALCIUM CHLORIDE 100 ML/HR: .6; .31; .03; .02 INJECTION, SOLUTION INTRAVENOUS at 04:50

## 2025-07-18 RX ADMIN — OXYBUTYNIN CHLORIDE 5 MG: 5 TABLET ORAL at 08:23

## 2025-07-18 NOTE — PLAN OF CARE
Problem: PAIN - ADULT  Goal: Verbalizes/displays adequate comfort level or baseline comfort level  Description: Interventions:  - Encourage patient to monitor pain and request assistance  - Assess pain using appropriate pain scale  - Administer analgesics as ordered based on type and severity of pain and evaluate response  - Implement non-pharmacological measures as appropriate and evaluate response  - Consider cultural and social influences on pain and pain management  - Notify physician/advanced practitioner if interventions unsuccessful or patient reports new pain  - Educate patient/family on pain management process including their role and importance of  reporting pain   - Provide non-pharmacologic/complimentary pain relief interventions  Outcome: Progressing     Problem: INFECTION - ADULT  Goal: Absence or prevention of progression during hospitalization  Description: INTERVENTIONS:  - Assess and monitor for signs and symptoms of infection  - Monitor lab/diagnostic results  - Monitor all insertion sites, i.e. indwelling lines, tubes, and drains  - Monitor endotracheal if appropriate and nasal secretions for changes in amount and color  - Powell appropriate cooling/warming therapies per order  - Administer medications as ordered  - Instruct and encourage patient and family to use good hand hygiene technique  - Identify and instruct in appropriate isolation precautions for identified infection/condition  Outcome: Progressing  Goal: Absence of fever/infection during neutropenic period  Description: INTERVENTIONS:  - Monitor WBC  - Perform strict hand hygiene  - Limit to healthy visitors only  - No plants, dried, fresh or silk flowers with pastrana in patient room  Outcome: Progressing     Problem: SAFETY ADULT  Goal: Patient will remain free of falls  Description: INTERVENTIONS:  - Educate patient/family on patient safety including physical limitations  - Instruct patient to call for assistance with activity   -  Consider consulting OT/PT to assist with strengthening/mobility based on AM PAC & JH-HLM score  - Consult OT/PT to assist with strengthening/mobility   - Keep Call bell within reach  - Keep bed low and locked with side rails adjusted as appropriate  - Keep care items and personal belongings within reach  - Initiate and maintain comfort rounds  - Make Fall Risk Sign visible to staff  - Offer Toileting every 2 Hours, in advance of need  - Initiate/Maintain alarm  - Obtain necessary fall risk management equipment  - Apply yellow socks and bracelet for high fall risk patients  - Consider moving patient to room near nurses station  Outcome: Progressing  Goal: Maintain or return to baseline ADL function  Description: INTERVENTIONS:  -  Assess patient's ability to carry out ADLs; assess patient's baseline for ADL function and identify physical deficits which impact ability to perform ADLs (bathing, care of mouth/teeth, toileting, grooming, dressing, etc.)  - Assess/evaluate cause of self-care deficits   - Assess range of motion  - Assess patient's mobility; develop plan if impaired  - Assess patient's need for assistive devices and provide as appropriate  - Encourage maximum independence but intervene and supervise when necessary  - Involve family in performance of ADLs  - Assess for home care needs following discharge   - Consider OT consult to assist with ADL evaluation and planning for discharge  - Provide patient education as appropriate  - Monitor functional capacity and physical performance, use of AM PAC & JH-HLM   - Monitor gait, balance and fatigue with ambulation    Outcome: Progressing  Goal: Maintains/Returns to pre admission functional level  Description: INTERVENTIONS:  - Perform AM-PAC 6 Click Basic Mobility/ Daily Activity assessment daily.  - Set and communicate daily mobility goal to care team and patient/family/caregiver.   - Collaborate with rehabilitation services on mobility goals if consulted  -  Perform Range of Motion 3 times a day.  - Reposition patient every 2 hours.  - Dangle patient 3 times a day  - Stand patient 3 times a day  - Ambulate patient 3 times a day  - Out of bed to chair 3 times a day   - Out of bed for meals 3 times a day  - Out of bed for toileting  - Record patient progress and toleration of activity level   Outcome: Progressing     Problem: DISCHARGE PLANNING  Goal: Discharge to home or other facility with appropriate resources  Description: INTERVENTIONS:  - Identify barriers to discharge w/patient and caregiver  - Arrange for needed discharge resources and transportation as appropriate  - Identify discharge learning needs (meds, wound care, etc.)  - Arrange for interpretive services to assist at discharge as needed  - Refer to Case Management Department for coordinating discharge planning if the patient needs post-hospital services based on physician/advanced practitioner order or complex needs related to functional status, cognitive ability, or social support system  Outcome: Progressing     Problem: Knowledge Deficit  Goal: Patient/family/caregiver demonstrates understanding of disease process, treatment plan, medications, and discharge instructions  Description: Complete learning assessment and assess knowledge base.  Interventions:  - Provide teaching at level of understanding  - Provide teaching via preferred learning methods  Outcome: Progressing     Problem: GENITOURINARY - ADULT  Goal: Maintains or returns to baseline urinary function  Description: INTERVENTIONS:  - Assess urinary function  - Encourage oral fluids to ensure adequate hydration if ordered  - Administer IV fluids as ordered to ensure adequate hydration  - Administer ordered medications as needed  - Offer frequent toileting  - Follow urinary retention protocol if ordered  Outcome: Progressing  Goal: Absence of urinary retention  Description: INTERVENTIONS:  - Assess patient’s ability to void and empty bladder  -  Monitor I/O  - Bladder scan as needed  - Discuss with physician/AP medications to alleviate retention as needed  - Discuss catheterization for long term situations as appropriate  Outcome: Progressing  Goal: Urinary catheter remains patent  Description: INTERVENTIONS:  - Assess patency of urinary catheter  - If patient has a chronic gonzales, consider changing catheter if non-functioning  - Follow guidelines for intermittent irrigation of non-functioning urinary catheter  Outcome: Progressing     Problem: METABOLIC, FLUID AND ELECTROLYTES - ADULT  Goal: Electrolytes maintained within normal limits  Description: INTERVENTIONS:  - Monitor labs and assess patient for signs and symptoms of electrolyte imbalances  - Administer electrolyte replacement as ordered  - Monitor response to electrolyte replacements, including repeat lab results as appropriate  - Instruct patient on fluid and nutrition as appropriate  Outcome: Progressing  Goal: Fluid balance maintained  Description: INTERVENTIONS:  - Monitor labs   - Monitor I/O and WT  - Instruct patient on fluid and nutrition as appropriate  - Assess for signs & symptoms of volume excess or deficit  Outcome: Progressing  Goal: Glucose maintained within target range  Description: INTERVENTIONS:  - Monitor Blood Glucose as ordered  - Assess for signs and symptoms of hyperglycemia and hypoglycemia  - Administer ordered medications to maintain glucose within target range  - Assess nutritional intake and initiate nutrition service referral as needed  Outcome: Progressing

## 2025-07-18 NOTE — ASSESSMENT & PLAN NOTE
Presented from home with sats right flank pain that began at 4 PM, now pain is radiating to right groin  No history of renal stones  CT abdomen pelvis with right distal ureteral stone  UA without pyuria/bacteriuria  Continue pain control  Patient seen by urology, no plan for urgent urologic intervention  Strain all urine  Continue Flomax  Patient passed the stone and states the flank pain has resolved and is feeling better now

## 2025-07-18 NOTE — NURSING NOTE
This RN strained pt's urine and noticed a small dark spec. Confirmed with LORIN Briones that it was a stone that pt passed in his urine. On call urology made aware. Stone remains in specimen cup in pt's bathroom at this time. Pt still complaining of some pain (2/10) at this time. Monitoring of this pt continues.

## 2025-07-18 NOTE — DISCHARGE SUMMARY
Discharge Summary - Hospitalist   Name: Yassine Capone 64 y.o. male I MRN: [id number]  Unit/Bed#: -01 I Date of Admission: 7/16/2025   Date of Service: 7/18/2025 I Hospital Day: 2     Assessment & Plan  Right ureteral stone  Presented from home with sats right flank pain that began at 4 PM, now pain is radiating to right groin  No history of renal stones  CT abdomen pelvis with right distal ureteral stone  UA without pyuria/bacteriuria  Continue pain control  Patient seen by urology, no plan for urgent urologic intervention  Strain all urine  Continue Flomax  Patient passed the stone and states the flank pain has resolved and is feeling better now  Severe systemic inflammatory response syndrome (SIRS) (HCC)  Leukocytosis, tachycardia, tachypnea  Lactic acid 6.8 > 2.7 > 1.7  Leukocytosis improving  Secondary to obstructing ureteral stone  No evidence of pyuria/complicated UTI  No evidence of infection therefore will monitor patient off antibiotics  Monitor fever curve, leukocytosis  Due to Incidence of kidney stone will discharge on a 5-day course of antibiotics to prevent UTIs  Hypertensive crisis  Significantly elevated blood pressures at time of ED presentation, improved with pain control  No history of hypertension  Trend blood pressures  Blood pressure improved     Medical Problems      Discharging Physician / Practitioner: Maria Alejandra Navas MD  PCP: Roxann Maldonado DO  Admission Date:   Admission Orders (From admission, onward)       Ordered        07/16/25 2025  INPATIENT ADMISSION  Once                          Discharge Date: 07/18/25    Next Steps for Physician/AP Assuming Care:  Fu outpt with urology    Test Results Pending at Discharge (will require follow up):  none    Medication Changes for Discharge & Rationale:   See after visit summary for reconciled discharge medications provided to patient and/or family.     Consultations During Hospital Stay:  urology    Procedures Performed:  "  none    Significant Findings / Test Results:   CT renal stone study abdomen pelvis wo contrast  Result Date: 7/16/2025  Impression: Fullness of the right renal pelvis with relative hyperdensity. Suspect infectious or hemorrhagic etiology. No hydroureter. Punctate calculus suspected in the distal ureter. Correlate with urinalysis follow-up per urology. Workstation performed: [id number]      Incidental Findings:       Hospital Course:   Yassine Capone is a 64 y.o. male patient who originally presented to the hospital on 7/16/2025 due to right ureteral stone and right renal colic.  Patient's CAT scan showed a small calculus in the distal ureter and UA was abnormal possibly due to kidney stone.  Patient is placed on IV fluid hydration Flomax and he finally passed the stone today and is feeling much better.  Will place him on a 5-day course of oral antibiotics and will have him follow-up outpatient with urology.  Please note that the stone was sent for analysis      Please see above list of diagnoses and related plan for additional information.     Discharge Day Visit / Exam:   Subjective: Patient denies any chest pain or shortness of breath or abdominal pain.  He passed a stone today  Vitals: Blood Pressure: 133/71 (07/18/25 0705)  Pulse: 74 (07/18/25 0705)  Temperature: (!) 97.2 °F (36.2 °C) (07/17/25 1507)  Temp Source: Temporal (07/16/25 1721)  Respirations: 16 (07/18/25 0705)  Height: 5' 5\" (165.1 cm) (07/16/25 2052)  Weight - Scale: 88.8 kg (195 lb 12.3 oz) (07/16/25 2052)  SpO2: 100 % (07/18/25 0705)  Physical Exam  Vitals and nursing note reviewed.   Constitutional:       Appearance: Normal appearance.   HENT:      Head: Normocephalic and atraumatic.      Right Ear: External ear normal.      Left Ear: External ear normal.      Nose: Nose normal.      Mouth/Throat:      Pharynx: Oropharynx is clear.     Eyes:      Pupils: Pupils are equal, round, and reactive to light.       Cardiovascular:      Rate and Rhythm: " Normal rate and regular rhythm.      Heart sounds: Normal heart sounds.   Pulmonary:      Effort: Pulmonary effort is normal.      Breath sounds: Normal breath sounds.   Abdominal:      General: Bowel sounds are normal.      Palpations: Abdomen is soft.      Tenderness: There is no abdominal tenderness.     Musculoskeletal:         General: Normal range of motion.      Cervical back: Normal range of motion and neck supple.     Skin:     General: Skin is warm and dry.      Capillary Refill: Capillary refill takes less than 2 seconds.     Neurological:      General: No focal deficit present.      Mental Status: He is alert and oriented to person, place, and time.     Psychiatric:         Mood and Affect: Mood normal.            Discussion with Family:     Discharge instructions/Information to patient and family:   See after visit summary for information provided to patient and family.      Provisions for Follow-Up Care:  See after visit summary for information related to follow-up care and any pertinent home health orders.      Mobility at time of Discharge:   Basic Mobility Inpatient Raw Score: 24  JH-HLM Goal: 8: Walk 250 feet or more  JH-HLM Achieved: 8: Walk 250 feet ot more  HLM Goal achieved. Continue to encourage appropriate mobility.     Disposition:   Home    Planned Readmission: none    Administrative Statements   Discharge Statement:  I have spent a total time of 35 minutes in caring for this patient on the day of the visit/encounter. .    **Please Note: This note may have been constructed using a voice recognition system**

## 2025-07-18 NOTE — ASSESSMENT & PLAN NOTE
Leukocytosis, tachycardia, tachypnea  Lactic acid 6.8 > 2.7 > 1.7  Leukocytosis improving  Secondary to obstructing ureteral stone  No evidence of pyuria/complicated UTI  No evidence of infection therefore will monitor patient off antibiotics  Monitor fever curve, leukocytosis  Due to Incidence of kidney stone will discharge on a 5-day course of antibiotics to prevent UTIs

## 2025-07-18 NOTE — PROGRESS NOTES
Progress Note - Urology   Name: Yassine Capone 64 y.o. male I MRN: [id number]  Unit/Bed#: -01 I Date of Admission: 7/16/2025   Date of Service: 7/18/2025 I Hospital Day: 2     Assessment & Plan  Right ureteral stone  64 year old PMH chronic back pain who presents with acute onset right flank pain at 4 PM with difficulty urinating and flank pain.  No prior history kidney stones.    CT scan Fullness of the right renal pelvis with relative hyperdensity. Suspect infectious or hemorrhagic etiology. No hydroureter. Punctate calculus suspected in the distal ureter.   Leukocytosis resolved, creatinine 1.2  Urinalysis notable for hematuria, not suspicions for infection,negative nitrites no WBC, occasional bacteria.    Stone passed and collected 7/17. asymptomatic        Plan:   Patient is urologically stable and ok for discharge once approved per primary team  Send for stone analysis, order placed and communicated with nursing team  Adequate hydration, low oxalate diet reviewed  Will need to continue monitoring hematuria.  Discussed utility of office cystoscopy, patient would like surveillance. If continued hematuria after passage of calculus, would consider cystoscopic examination for complete urologic care.  OP urology follow up 3-4 weeks with KUB one hour prior to appointment, order in chart, office tasked with follow up appt            24 Hour Events : passage of kidney stoen  Subjective :   Yassine Capone  is a 64 y.o. male PMH chronic back pain who presents with acute onset right flank pain at 4 PM with difficulty urinating and flank pain.  Has had a vasectomy over 10 years ago. No prior history kidney stones or other urologic surgery. No family history prostate cancer, he is a non-smoker and  by trade without known exposure to petroleum or hazard chemicals.  CT scan revealed fullness of the right renal pelvis with relative hyperdensity. Suspect infectious or hemorrhagic etiology. No hydroureter.  Punctate calculus suspected in the distal ureter. Admission labs notable for  WBC18.2, creatinine 1.22, Lactic acid 6.8, anion gap 18, urinalysis notable for blood, not suspicious for infection.  Received ceftriaxone in ED. Psa followed by PCP with result of 2.27 in November 2024, and most recently 1.9, DANIELLA not perfomred by PCP, defers DANIELLA at this time. Stone passed and collected 7/17    He is afebrile, resolved leukocytosis, creatinine 1.2, hemodynamically stable.   He is pain-free and asymptomatic.  Denies fever, chills, flank pain, voiding volitionally no frequency, urgency, flow is good.  Appetite and bowels normal.   Motivated for discharge                  Objective :  Temp:  [97.2 °F (36.2 °C)] 97.2 °F (36.2 °C)  HR:  [69-74] 74  BP: (133-153)/(71-80) 133/71  Resp:  [16-18] 16  SpO2:  [96 %-100 %] 100 %  O2 Device: None (Room air)    Physical Exam  Vitals and nursing note reviewed.   Constitutional:       General: He is not in acute distress.     Appearance: Normal appearance. He is well-developed. He is not ill-appearing.   HENT:      Head: Normocephalic and atraumatic.      Right Ear: External ear normal.      Left Ear: External ear normal.      Nose: Nose normal.     Eyes:      Extraocular Movements: Extraocular movements intact.      Pupils: Pupils are equal, round, and reactive to light.       Cardiovascular:      Rate and Rhythm: Normal rate.   Pulmonary:      Effort: Pulmonary effort is normal. No respiratory distress.   Abdominal:      Palpations: Abdomen is soft.      Tenderness: There is no abdominal tenderness. There is no right CVA tenderness, left CVA tenderness or guarding.     Musculoskeletal:         General: Normal range of motion.      Cervical back: Normal range of motion and neck supple.     Skin:     General: Skin is warm and dry.     Neurological:      General: No focal deficit present.      Mental Status: He is alert and oriented to person, place, and time. Mental status is at baseline.      Psychiatric:         Mood and Affect: Mood normal.         Behavior: Behavior normal.         Thought Content: Thought content normal.         Judgment: Judgment normal.         Lab Results: I have reviewed the following results:CBC/BMP:   .     07/18/25  0454   WBC 9.74   HGB 13.0   HCT 39.4      SODIUM 138   K 4.3      CO2 29   BUN 17   CREATININE 1.28   GLUC 106          Other Study Results Review: No additional pertinent studies reviewed.    VTE Pharmacologic Prophylaxis: VTE covered by:    None

## 2025-07-20 LAB
BACTERIA BLD CULT: NORMAL
BACTERIA BLD CULT: NORMAL

## 2025-07-21 PROCEDURE — 88112 CYTOPATH CELL ENHANCE TECH: CPT | Performed by: PATHOLOGY

## 2025-07-21 NOTE — UTILIZATION REVIEW
NOTIFICATION OF ADMISSION DISCHARGE   This is a Notification of Discharge from [facility]. Please be advised that this patient has been discharge from our facility. Below you will find the admission and discharge date and time including the patient’s disposition.   UTILIZATION REVIEW CONTACT:  Utilization Review Assistants  Network Utilization Review Department  Phone: [phone] x carefully listen to the prompts. All voicemails are confidential.  Email: [email or web address]     ADMISSION INFORMATION  PRESENTATION DATE: 7/16/2025  6:30 PM  OBERVATION ADMISSION DATE: N/A  INPATIENT ADMISSION DATE: 7/16/25  8:25 PM   DISCHARGE DATE: 7/18/2025 11:01 AM   DISPOSITION:Home/Self Care    Network Utilization Review Department  ATTENTION: Please call with any questions or concerns to [phone] and carefully listen to the prompts so that you are directed to the right person. All voicemails are confidential.   For Discharge needs, contact Care Management DC Support Team at [phone] opt. [phone]  Send all requests for admission clinical reviews, approved or denied determinations and any other requests to dedicated fax number below belonging to the campus where the patient is receiving treatment. List of dedicated fax numbers for the Facilities:  FACILITY NAME UR FAX NUMBER   ADMISSION DENIALS (Administrative/Medical Necessity) [phone]   DISCHARGE SUPPORT TEAM ([facility]) [phone]   PARENT CHILD HEALTH (Maternity/NICU/Pediatrics) [phone]   [facility] [phone]   [facility] [phone]   [facility] [phone]   [facility] [phone]   [facility] [phone]   [facility] [phone]   [facility] [phone]   [facility] [phone]   [facility]  [facility] [phone]   [facility] [phone]   [facility] [phone]   [facility] [phone]

## 2025-07-22 LAB
BACTERIA BLD CULT: NORMAL
BACTERIA BLD CULT: NORMAL

## 2025-07-27 LAB
CALCIUM OXALATE DIHYDRATE MFR STONE IR: 20 %
COLOR STONE: NORMAL
COM MFR STONE: 80 %
SIZE STONE: NORMAL MM
SPEC SOURCE SUBJ: NORMAL
WT STONE: 2 MG

## 2025-08-07 ENCOUNTER — TELEPHONE (OUTPATIENT)
Dept: UROLOGY | Facility: CLINIC | Age: 65
End: 2025-08-07

## 2025-08-12 ENCOUNTER — HOSPITAL ENCOUNTER (OUTPATIENT)
Dept: RADIOLOGY | Facility: HOSPITAL | Age: 65
Discharge: HOME | End: 2025-08-12
Payer: COMMERCIAL

## 2025-08-12 ENCOUNTER — OFFICE VISIT (OUTPATIENT)
Dept: UROLOGY | Facility: CLINIC | Age: 65
End: 2025-08-12
Payer: COMMERCIAL